# Patient Record
Sex: FEMALE | Race: BLACK OR AFRICAN AMERICAN | Employment: UNEMPLOYED | URBAN - METROPOLITAN AREA
[De-identification: names, ages, dates, MRNs, and addresses within clinical notes are randomized per-mention and may not be internally consistent; named-entity substitution may affect disease eponyms.]

---

## 2017-06-20 PROBLEM — I10 ESSENTIAL HYPERTENSION: Status: ACTIVE | Noted: 2017-06-20

## 2017-06-24 ENCOUNTER — HOSPITAL ENCOUNTER (OUTPATIENT)
Dept: CT IMAGING | Age: 47
Discharge: HOME | End: 2017-06-24
Attending: NURSE PRACTITIONER
Payer: COMMERCIAL

## 2017-06-24 DIAGNOSIS — R59.0 MEDIASTINAL ADENOPATHY: Chronic | ICD-10-CM

## 2017-06-24 DIAGNOSIS — A18.2 TUBERCULOUS LYMPHADENITIS: ICD-10-CM

## 2017-06-24 PROCEDURE — 71250 CT THORAX DX C-: CPT

## 2017-09-23 ENCOUNTER — HOSPITAL ENCOUNTER (OUTPATIENT)
Dept: MAMMOGRAPHY | Age: 47
Discharge: HOME | End: 2017-09-23
Attending: INTERNAL MEDICINE
Payer: COMMERCIAL

## 2017-09-23 DIAGNOSIS — Z12.31 VISIT FOR SCREENING MAMMOGRAM: ICD-10-CM

## 2017-09-23 PROCEDURE — 77067 SCR MAMMO BI INCL CAD: CPT

## 2018-08-14 PROBLEM — E66.01 OBESITY, MORBID (HCC): Status: ACTIVE | Noted: 2018-08-14

## 2018-08-31 ENCOUNTER — HOSPITAL ENCOUNTER (OUTPATIENT)
Dept: SLEEP MEDICINE | Age: 48
Discharge: HOME | End: 2018-08-31
Attending: INTERNAL MEDICINE
Payer: COMMERCIAL

## 2018-08-31 PROCEDURE — 95810 POLYSOM 6/> YRS 4/> PARAM: CPT

## 2018-09-14 ENCOUNTER — HOSPITAL ENCOUNTER (OUTPATIENT)
Dept: SLEEP MEDICINE | Age: 48
Discharge: HOME | End: 2018-09-14
Attending: INTERNAL MEDICINE
Payer: COMMERCIAL

## 2018-09-14 PROCEDURE — 95811 POLYSOM 6/>YRS CPAP 4/> PARM: CPT

## 2018-10-06 ENCOUNTER — HOSPITAL ENCOUNTER (OUTPATIENT)
Dept: MAMMOGRAPHY | Age: 48
Discharge: HOME | End: 2018-10-06
Attending: INTERNAL MEDICINE
Payer: COMMERCIAL

## 2018-10-06 DIAGNOSIS — Z12.39 BREAST CANCER SCREENING: ICD-10-CM

## 2018-10-06 PROCEDURE — 77067 SCR MAMMO BI INCL CAD: CPT

## 2019-05-11 ENCOUNTER — HOSPITAL ENCOUNTER (EMERGENCY)
Age: 49
Discharge: HOME | End: 2019-05-11
Attending: EMERGENCY MEDICINE
Payer: COMMERCIAL

## 2019-05-11 ENCOUNTER — APPOINTMENT (OUTPATIENT)
Dept: GENERAL RADIOLOGY | Age: 49
End: 2019-05-11
Attending: EMERGENCY MEDICINE
Payer: COMMERCIAL

## 2019-05-11 VITALS
HEART RATE: 91 BPM | RESPIRATION RATE: 20 BRPM | HEIGHT: 65 IN | DIASTOLIC BLOOD PRESSURE: 85 MMHG | SYSTOLIC BLOOD PRESSURE: 131 MMHG | OXYGEN SATURATION: 98 % | BODY MASS INDEX: 44.98 KG/M2 | TEMPERATURE: 98.5 F | WEIGHT: 270 LBS

## 2019-05-11 DIAGNOSIS — J06.9 VIRAL URI WITH COUGH: Primary | ICD-10-CM

## 2019-05-11 LAB — DEPRECATED S PYO AG THROAT QL EIA: NEGATIVE

## 2019-05-11 PROCEDURE — 74011000250 HC RX REV CODE- 250: Performed by: EMERGENCY MEDICINE

## 2019-05-11 PROCEDURE — 94760 N-INVAS EAR/PLS OXIMETRY 1: CPT

## 2019-05-11 PROCEDURE — 87880 STREP A ASSAY W/OPTIC: CPT

## 2019-05-11 PROCEDURE — 71046 X-RAY EXAM CHEST 2 VIEWS: CPT

## 2019-05-11 PROCEDURE — 99283 EMERGENCY DEPT VISIT LOW MDM: CPT | Performed by: EMERGENCY MEDICINE

## 2019-05-11 PROCEDURE — 94640 AIRWAY INHALATION TREATMENT: CPT

## 2019-05-11 PROCEDURE — 87081 CULTURE SCREEN ONLY: CPT

## 2019-05-11 RX ORDER — OMEPRAZOLE 20 MG/1
20 CAPSULE, DELAYED RELEASE ORAL DAILY
COMMUNITY
End: 2019-09-05

## 2019-05-11 RX ORDER — ALBUTEROL SULFATE 0.83 MG/ML
2.5 SOLUTION RESPIRATORY (INHALATION)
Status: COMPLETED | OUTPATIENT
Start: 2019-05-11 | End: 2019-05-11

## 2019-05-11 RX ORDER — ALBUTEROL SULFATE 90 UG/1
1 AEROSOL, METERED RESPIRATORY (INHALATION)
Qty: 1 INHALER | Refills: 0 | Status: SHIPPED | OUTPATIENT
Start: 2019-05-11 | End: 2019-11-05 | Stop reason: SDUPTHER

## 2019-05-11 RX ADMIN — ALBUTEROL SULFATE 2.5 MG: 2.5 SOLUTION RESPIRATORY (INHALATION) at 20:44

## 2019-05-12 NOTE — DISCHARGE INSTRUCTIONS
Patient Education        Upper Respiratory Infection (Cold): Care Instructions  Your Care Instructions    An upper respiratory infection, or URI, is an infection of the nose, sinuses, or throat. URIs are spread by coughs, sneezes, and direct contact. The common cold is the most frequent kind of URI. The flu and sinus infections are other kinds of URIs. Almost all URIs are caused by viruses. Antibiotics won't cure them. But you can treat most infections with home care. This may include drinking lots of fluids and taking over-the-counter pain medicine. You will probably feel better in 4 to 10 days. The doctor has checked you carefully, but problems can develop later. If you notice any problems or new symptoms, get medical treatment right away. Follow-up care is a key part of your treatment and safety. Be sure to make and go to all appointments, and call your doctor if you are having problems. It's also a good idea to know your test results and keep a list of the medicines you take. How can you care for yourself at home? · To prevent dehydration, drink plenty of fluids, enough so that your urine is light yellow or clear like water. Choose water and other caffeine-free clear liquids until you feel better. If you have kidney, heart, or liver disease and have to limit fluids, talk with your doctor before you increase the amount of fluids you drink. · Take an over-the-counter pain medicine, such as acetaminophen (Tylenol), ibuprofen (Advil, Motrin), or naproxen (Aleve). Read and follow all instructions on the label. · Before you use cough and cold medicines, check the label. These medicines may not be safe for young children or for people with certain health problems. · Be careful when taking over-the-counter cold or flu medicines and Tylenol at the same time. Many of these medicines have acetaminophen, which is Tylenol. Read the labels to make sure that you are not taking more than the recommended dose.  Too much acetaminophen (Tylenol) can be harmful. · Get plenty of rest.  · Do not smoke or allow others to smoke around you. If you need help quitting, talk to your doctor about stop-smoking programs and medicines. These can increase your chances of quitting for good. When should you call for help? Call 911 anytime you think you may need emergency care. For example, call if:    · You have severe trouble breathing.    Call your doctor now or seek immediate medical care if:    · You seem to be getting much sicker.     · You have new or worse trouble breathing.     · You have a new or higher fever.     · You have a new rash.    Watch closely for changes in your health, and be sure to contact your doctor if:    · You have a new symptom, such as a sore throat, an earache, or sinus pain.     · You cough more deeply or more often, especially if you notice more mucus or a change in the color of your mucus.     · You do not get better as expected. Where can you learn more? Go to http://layla-keely.info/. Enter K131 in the search box to learn more about \"Upper Respiratory Infection (Cold): Care Instructions. \"  Current as of: September 5, 2018  Content Version: 11.9  © 9391-4808 [employer]. Care instructions adapted under license by [employer] (formerly [employer]) (which disclaims liability or warranty for this information). If you have questions about a medical condition or this instruction, always ask your healthcare professional. [place] [address] any warranty or liability for your use of this information.

## 2019-05-12 NOTE — ED PROVIDER NOTES
67-year-old -American female with sore throat for the past 3 days and cough for 2 days. Cough is productive of a yellow greenish sputum. Denies underlying lung disease and does not smoke. No fever. No vomiting or diarrhea. She reports that she could hear some wheezing in her chest.  Patient was treated with albuterol nebulizer prior to my evaluation of her. The history is provided by the patient. Cough Associated symptoms include sore throat and wheezing. Pertinent negatives include no headaches and no vomiting. Sore Throat Associated symptoms include cough. Pertinent negatives include no vomiting, no congestion and no headaches. Past Medical History:  
Diagnosis Date  Acquired hypothyroidism 2016  Anemia 2012  Anxiety PRN meds  Anxiety and depression  Asthma   
 inhalers PRN, no home 02  
 Constipation 2012  Depression   
 non longer on meds  H/O blood clots   
 right breast  
 H/O parathyroidectomy 2012  H/O partial thyroidectomy 2012  Hypertension   
 controlled with med  Insomnia 2016  Mild HTN 2012  Thromboembolus ([address]   
 during pregnancy, right breast  
 Thyroid disease   
 hypo Past Surgical History:  
Procedure Laterality Date  BREAST SURGERY PROCEDURE UNLISTED Right   
 removed \"clot\" from breast- right  CHEST SURGERY PROCEDURE UNLISTED    
 bronchoscpy & mediastinotomy  HX  SECTION    
 HX HYSTERECTOMY partial  
 HX ORTHOPAEDIC Left   
 ankle sx w/ hardware  HX PARATHYROIDECTOMY  HX PARTIAL THYROIDECTOMY Family History:  
Problem Relation Age of Onset  No Known Problems Mother  Cancer Father  Hypertension Other  Thyroid Disease Other Social History Socioeconomic History  Marital status: SINGLE Spouse name: Not on file  Number of children: Not on file  Years of education: Not on file  Highest education level: Not on file Occupational History  Not on file Social Needs  Financial resource strain: Not on file  Food insecurity:  
  Worry: Not on file Inability: Not on file  Transportation needs:  
  Medical: Not on file Non-medical: Not on file Tobacco Use  Smoking status: Never Smoker  Smokeless tobacco: Never Used Substance and Sexual Activity  Alcohol use: Yes Alcohol/week: 0.0 oz  
  Comment: occasional: once a month  Drug use: No  
 Sexual activity: Not on file Lifestyle  Physical activity:  
  Days per week: Not on file Minutes per session: Not on file  Stress: Not on file Relationships  Social connections:  
  Talks on phone: Not on file Gets together: Not on file Attends Bahai service: Not on file Active member of club or organization: Not on file Attends meetings of clubs or organizations: Not on file Relationship status: Not on file  Intimate partner violence:  
  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 Review of Systems Constitutional: Negative for fever. HENT: Positive for sore throat. Negative for congestion. Respiratory: Positive for cough and wheezing. Gastrointestinal: Negative for abdominal pain and vomiting. Genitourinary: Negative for dysuria. Musculoskeletal: Negative for back pain. Skin: Negative for rash. Neurological: Negative for headaches. Vitals:  
 05/11/19 2025 05/11/19 2045 BP: 140/86 Pulse: 98 Resp: 18 Temp: 98.4 °F (36.9 °C) SpO2: 99% 100% Weight: 122.5 kg (270 lb) Height: 5' 5\" (1.651 m) Physical Exam  
 Constitutional: She appears well-developed and well-nourished. No distress. HENT:  
Head: Normocephalic and atraumatic. Mild tonsillar erythema. Uvula midline. No trismus. Eyes: Pupils are equal, round, and reactive to light. Conjunctivae are normal.  
Neck: Normal range of motion. Neck supple. Cardiovascular: Normal rate and regular rhythm. Pulmonary/Chest: Effort normal and breath sounds normal.  
Neurological: She is alert. Skin: Skin is warm. Psychiatric: She has a normal mood and affect. Nursing note and vitals reviewed. MDM Number of Diagnoses or Management Options Diagnosis management comments: Chest x-ray no acute abnormality. Rapid strep negative. No findings on history or physical to suggest bacterial infection. We'll treat her symptomatically with albuterol as this has worked well in her wheezing and cough. Amount and/or Complexity of Data Reviewed Clinical lab tests: ordered and reviewed Tests in the radiology section of CPT®: ordered and reviewed Risk of Complications, Morbidity, and/or Mortality Presenting problems: low Diagnostic procedures: low Management options: low Procedures

## 2019-05-12 NOTE — ED NOTES
I have reviewed discharge instructions with the patient. The patient verbalized understanding. Patient left ED via Discharge Method: ambulatory to Home with self. Opportunity for questions and clarification provided. Patient given 1 scripts. To continue your aftercare when you leave the hospital, you may receive an automated call from our care team to check in on how you are doing. This is a free service and part of our promise to provide the best care and service to meet your aftercare needs.  If you have questions, or wish to unsubscribe from this service please call [phone]. Thank you for Choosing our [facility] Emergency Department.

## 2019-05-14 LAB
BACTERIA SPEC CULT: NORMAL
SERVICE CMNT-IMP: NORMAL

## 2019-09-05 PROBLEM — A04.8 HELICOBACTER PYLORI INFECTION: Status: ACTIVE | Noted: 2019-06-03

## 2019-09-05 PROBLEM — R13.10 DYSPHAGIA: Status: ACTIVE | Noted: 2019-03-27

## 2019-10-04 PROBLEM — G47.33 OSA (OBSTRUCTIVE SLEEP APNEA): Status: ACTIVE | Noted: 2019-10-04

## 2019-11-01 PROBLEM — Z86.010 HISTORY OF COLON POLYPS: Status: ACTIVE | Noted: 2017-10-10

## 2020-01-07 ENCOUNTER — APPOINTMENT (OUTPATIENT)
Dept: GENERAL RADIOLOGY | Age: 50
End: 2020-01-07
Attending: EMERGENCY MEDICINE
Payer: COMMERCIAL

## 2020-01-07 ENCOUNTER — HOSPITAL ENCOUNTER (EMERGENCY)
Age: 50
Discharge: HOME | End: 2020-01-07
Attending: EMERGENCY MEDICINE
Payer: COMMERCIAL

## 2020-01-07 VITALS
BODY MASS INDEX: 43.32 KG/M2 | HEART RATE: 74 BPM | RESPIRATION RATE: 18 BRPM | WEIGHT: 260 LBS | HEIGHT: 65 IN | SYSTOLIC BLOOD PRESSURE: 134 MMHG | DIASTOLIC BLOOD PRESSURE: 87 MMHG | TEMPERATURE: 98.4 F | OXYGEN SATURATION: 98 %

## 2020-01-07 DIAGNOSIS — R07.9 CHEST PAIN OF UNCERTAIN ETIOLOGY: Primary | ICD-10-CM

## 2020-01-07 LAB
ALBUMIN SERPL-MCNC: 3.5 G/DL (ref 3.5–5)
ALBUMIN/GLOB SERPL: 0.8 {RATIO} (ref 1.2–3.5)
ALP SERPL-CCNC: 107 U/L (ref 50–130)
ALT SERPL-CCNC: 17 U/L (ref 12–65)
ANION GAP SERPL CALC-SCNC: 3 MMOL/L (ref 7–16)
AST SERPL-CCNC: 12 U/L (ref 15–37)
ATRIAL RATE: 87 BPM
BASOPHILS # BLD: 0 K/UL (ref 0–0.2)
BASOPHILS NFR BLD: 0 % (ref 0–2)
BILIRUB SERPL-MCNC: 0.5 MG/DL (ref 0.2–1.1)
BUN SERPL-MCNC: 12 MG/DL (ref 6–23)
CALCIUM SERPL-MCNC: 10.5 MG/DL (ref 8.3–10.4)
CALCULATED P AXIS, ECG09: 60 DEGREES
CALCULATED R AXIS, ECG10: 60 DEGREES
CALCULATED T AXIS, ECG11: 43 DEGREES
CHLORIDE SERPL-SCNC: 103 MMOL/L (ref 98–107)
CO2 SERPL-SCNC: 34 MMOL/L (ref 21–32)
CREAT SERPL-MCNC: 0.8 MG/DL (ref 0.6–1)
DIAGNOSIS, 93000: NORMAL
DIFFERENTIAL METHOD BLD: ABNORMAL
EOSINOPHIL # BLD: 0.2 K/UL (ref 0–0.8)
EOSINOPHIL NFR BLD: 3 % (ref 0.5–7.8)
ERYTHROCYTE [DISTWIDTH] IN BLOOD BY AUTOMATED COUNT: 14.8 % (ref 11.9–14.6)
GLOBULIN SER CALC-MCNC: 4.4 G/DL (ref 2.3–3.5)
GLUCOSE SERPL-MCNC: 105 MG/DL (ref 65–100)
HCT VFR BLD AUTO: 41.2 % (ref 35.8–46.3)
HGB BLD-MCNC: 13 G/DL (ref 11.7–15.4)
IMM GRANULOCYTES # BLD AUTO: 0 K/UL (ref 0–0.5)
IMM GRANULOCYTES NFR BLD AUTO: 0 % (ref 0–5)
LYMPHOCYTES # BLD: 2.1 K/UL (ref 0.5–4.6)
LYMPHOCYTES NFR BLD: 30 % (ref 13–44)
MCH RBC QN AUTO: 27.5 PG (ref 26.1–32.9)
MCHC RBC AUTO-ENTMCNC: 31.6 G/DL (ref 31.4–35)
MCV RBC AUTO: 87.3 FL (ref 79.6–97.8)
MONOCYTES # BLD: 0.6 K/UL (ref 0.1–1.3)
MONOCYTES NFR BLD: 8 % (ref 4–12)
NEUTS SEG # BLD: 4.1 K/UL (ref 1.7–8.2)
NEUTS SEG NFR BLD: 58 % (ref 43–78)
NRBC # BLD: 0 K/UL (ref 0–0.2)
P-R INTERVAL, ECG05: 170 MS
PLATELET # BLD AUTO: 376 K/UL (ref 150–450)
PMV BLD AUTO: 9.2 FL (ref 9.4–12.3)
POTASSIUM SERPL-SCNC: 3.3 MMOL/L (ref 3.5–5.1)
PROT SERPL-MCNC: 7.9 G/DL (ref 6.3–8.2)
Q-T INTERVAL, ECG07: 370 MS
QRS DURATION, ECG06: 92 MS
QTC CALCULATION (BEZET), ECG08: 445 MS
RBC # BLD AUTO: 4.72 M/UL (ref 4.05–5.2)
SODIUM SERPL-SCNC: 140 MMOL/L (ref 136–145)
TROPONIN I SERPL-MCNC: <0.02 NG/ML (ref 0.02–0.05)
TROPONIN I SERPL-MCNC: <0.02 NG/ML (ref 0.02–0.05)
VENTRICULAR RATE, ECG03: 87 BPM
WBC # BLD AUTO: 7 K/UL (ref 4.3–11.1)

## 2020-01-07 PROCEDURE — 96374 THER/PROPH/DIAG INJ IV PUSH: CPT | Performed by: EMERGENCY MEDICINE

## 2020-01-07 PROCEDURE — 74011250637 HC RX REV CODE- 250/637: Performed by: EMERGENCY MEDICINE

## 2020-01-07 PROCEDURE — 93005 ELECTROCARDIOGRAM TRACING: CPT | Performed by: EMERGENCY MEDICINE

## 2020-01-07 PROCEDURE — 84484 ASSAY OF TROPONIN QUANT: CPT

## 2020-01-07 PROCEDURE — 85025 COMPLETE CBC W/AUTO DIFF WBC: CPT

## 2020-01-07 PROCEDURE — 71046 X-RAY EXAM CHEST 2 VIEWS: CPT

## 2020-01-07 PROCEDURE — 74011250636 HC RX REV CODE- 250/636: Performed by: EMERGENCY MEDICINE

## 2020-01-07 PROCEDURE — 99285 EMERGENCY DEPT VISIT HI MDM: CPT | Performed by: EMERGENCY MEDICINE

## 2020-01-07 PROCEDURE — 80053 COMPREHEN METABOLIC PANEL: CPT

## 2020-01-07 RX ORDER — POTASSIUM CHLORIDE 20 MEQ/1
40 TABLET, EXTENDED RELEASE ORAL
Status: COMPLETED | OUTPATIENT
Start: 2020-01-07 | End: 2020-01-07

## 2020-01-07 RX ORDER — KETOROLAC TROMETHAMINE 30 MG/ML
15 INJECTION, SOLUTION INTRAMUSCULAR; INTRAVENOUS
Status: COMPLETED | OUTPATIENT
Start: 2020-01-07 | End: 2020-01-07

## 2020-01-07 RX ADMIN — KETOROLAC TROMETHAMINE 15 MG: 30 INJECTION, SOLUTION INTRAMUSCULAR at 18:56

## 2020-01-07 RX ADMIN — POTASSIUM CHLORIDE 40 MEQ: 1500 TABLET, EXTENDED RELEASE ORAL at 17:46

## 2020-01-07 NOTE — ED TRIAGE NOTES
Patient advises that she will have sudden on set of chest pain with shortness of breath and nausea. Patient advises that she is also having some pain to left shoulder. Patient advises pain comes and goes only lasting a few minutes. Patient advises that she has taken her BP medication already today.

## 2020-01-07 NOTE — DISCHARGE INSTRUCTIONS
As we discussed, the exact cause of your chest pain is not definitely known. However it is possible that it could be due to inflammation in your chest wall. Take ibuprofen 600mg three times a day with food.  Follow up with cardiology to into the cause of your chest pain determine if you need further testing such as a stress test.

## 2020-01-07 NOTE — LETTER
[id number] [address] EMERGENCY DEPT 
[address] [facility] [address] [phone] Work/School Note Date: 1/7/2020 To Whom It May concern: 
 
Maximilian Cross was seen and treated today in the emergency room by the following provider(s): 
Attending Provider: Bina Amaya MD. Maximilian Cross may return to work on Thursday January 9th 2020. Sincerely, Nicky Perrin RN

## 2020-01-07 NOTE — ED PROVIDER NOTES
[address] Judy Durán is a 51 y.o. female seen on 1/7/2020 at 5:29 PM in the [facility] EMERGENCY DEPT in room HB/B. Chief Complaint   Patient presents with    Chest Pain     HPI: 81-year-old female presenting to the emergency department for evaluation of chest pain. She states that she has had chest pain off and on for the last 2 days. She describes it as sharp, located sternally. It is associated with movement of her shoulders and chest wall but not associated with exertion or walking. No associated shortness of breath or diaphoresis. There is no radiation of pain. Pain is also reproducible to palpation. She has no history of similar symptoms in the past.  She states she thinks she may have seen a cardiologist once and then in the past for chest pain but cannot remember. Historian: Patient    REVIEW OF SYSTEMS     Review of Systems   Constitutional: Negative for fever. HENT: Negative. Eyes: Negative. Respiratory: Negative for cough, chest tightness, shortness of breath and wheezing. Cardiovascular: Positive for chest pain. Gastrointestinal: Negative for abdominal distention, abdominal pain, constipation, diarrhea and vomiting. Endocrine: Negative. Genitourinary: Negative for dysuria, flank pain, frequency and urgency. Neurological: Negative for dizziness, syncope and headaches. Psychiatric/Behavioral: Negative. All other systems reviewed and are negative.       PAST MEDICAL HISTORY     Past Medical History:   Diagnosis Date    Acquired hypothyroidism 1/25/2016    Anemia 7/12/2012    Anxiety     PRN meds    Anxiety and depression     Asthma     inhalers PRN, no home 02    Constipation 7/12/2012    Depression     non longer on meds    Diabetes ([address]     H/O blood clots     right breast    H/O parathyroidectomy 7/12/2012    H/O partial thyroidectomy 7/12/2012    Hypertension     controlled with med    Infectious disease TB    Insomnia 2016    Mild HTN 2012    Thromboembolus ([address]     during pregnancy, right breast    Thyroid disease     hypo     Past Surgical History:   Procedure Laterality Date    BREAST SURGERY PROCEDURE UNLISTED Right     removed \"clot\" from breast- right    CHEST SURGERY PROCEDURE UNLISTED      bronchoscpy & mediastinotomy    HX  SECTION      HX HYSTERECTOMY      partial    HX ORTHOPAEDIC Left     ankle sx w/ hardware    HX PARATHYROIDECTOMY      HX PARTIAL THYROIDECTOMY       Social History     Socioeconomic History    Marital status: SINGLE     Spouse name: Not on file    Number of children: Not on file    Years of education: Not on file    Highest education level: Not on file   Tobacco Use    Smoking status: Never Smoker    Smokeless tobacco: Never Used   Substance and Sexual Activity    Alcohol use: Yes     Alcohol/week: 0.0 standard drinks     Comment: occasional: once a month    Drug use: No   Social History Narrative    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, 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 Taking? ALPRAZolam (XANAX) 0.5 mg tablet   No No   Sig: Take 1 Tab by mouth two (2) times daily as needed for Anxiety. Max Daily Amount: 1 mg. Blood-Glucose Meter monitoring kit   No No   Sig: Accu-chek Guide Monitor System or any meter insurance will cover check BS BID DX:E11.9   acetaminophen (TYLENOL) 500 mg tablet   No No   Sig: Take 1 Tab by mouth every six (6) hours as needed for Pain. albuterol (PROVENTIL HFA, VENTOLIN HFA, PROAIR HFA) 90 mcg/actuation inhaler   No No   Sig: Take 1 Puff by inhalation every four (4) hours as needed for Wheezing. cetirizine (ZYRTEC) 10 mg tablet   No No   Sig: Take 1 Tab by mouth daily. cyclobenzaprine (FLEXERIL) 10 mg tablet   No No   Sig: Take 1 Tab by mouth three (3) times daily as needed for Muscle Spasm(s).    doxycycline (MONODOX) 100 mg capsule   No No   Sig: Take 1 Cap by mouth two (2) times a day for 7 days. ertugliflozin (STEGLATRO) 5 mg tab   No No   Sig: Take 5 mg by mouth daily. fluticasone propionate (FLONASE) 50 mcg/actuation nasal spray   No No   Si Sprays by Both Nostrils route daily. glucose blood VI test strips (ASCENSIA AUTODISC VI, ONE TOUCH ULTRA TEST VI) strip   No No   Sig: Accu-chek Erika Plus test strips or any test strips insurance will cover. Test BID prn. Dx:E11.9   hydroCHLOROthiazide (HYDRODIURIL) 25 mg tablet   No No   Sig: Take 1 Tab by mouth daily. ibuprofen (MOTRIN) 800 mg tablet   No No   Sig: Take 1 Tab by mouth every eight (8) hours as needed for Pain.   lancets misc   No No   Sig: Accu-chek Guide fastclix lancet drums; Check BS BID; Dx:E11.9   levothyroxine (SYNTHROID) 88 mcg tablet   No No   Sig: Take 1 Tab by mouth Daily (before breakfast). lisinopril (PRINIVIL, ZESTRIL) 20 mg tablet   No No   Sig: Take 1 Tab by mouth daily. metFORMIN (GLUCOPHAGE) 500 mg tablet   No No   Sig: Take 2 Tabs by mouth two (2) times a day. Facility-Administered Medications: None     Allergies   Allergen Reactions    Other Medication Other (comments)     Unknown med given at cardiology -Possible dye ( BODY WENT NUMB ALL OVER)        PHYSICAL EXAM       Vitals:    20 1550   BP: (!) 170/101   Pulse: 85   Resp: 16   Temp: 98.5 °F (36.9 °C)   SpO2: 96%    Vital signs were reviewed. Physical Exam  Vitals signs reviewed. Constitutional:       General: She is not in acute distress. Appearance: She is well-developed. She is not diaphoretic. HENT:      Head: Normocephalic and atraumatic. Eyes:      Pupils: Pupils are equal, round, and reactive to light. Neck:      Musculoskeletal: Normal range of motion and neck supple. Cardiovascular:      Rate and Rhythm: Normal rate and regular rhythm. Heart sounds: Normal heart sounds. No murmur. No friction rub. No gallop.     Pulmonary:      Effort: Pulmonary effort is normal. No respiratory distress. Breath sounds: Normal breath sounds. No stridor. No wheezing. Chest:      Chest wall: Tenderness (reproduces chest pain) present. Abdominal:      General: Bowel sounds are normal. There is no distension. Palpations: Abdomen is soft. There is no mass. Tenderness: There is no tenderness. There is no guarding or rebound. Musculoskeletal: Normal range of motion. General: No tenderness or deformity. Skin:     General: Skin is warm and dry. Findings: No erythema or rash. Neurological:      Mental Status: She is alert and oriented to person, place, and time. Sensory: No sensory deficit. Comments: No focal neuro deficits   Psychiatric:         Behavior: Behavior normal.          MEDICAL DECISION MAKING     MDM  Number of Diagnoses or Management Options     Amount and/or Complexity of Data Reviewed  Clinical lab tests: ordered and reviewed  Tests in the radiology section of CPT®: ordered and reviewed  Review and summarize past medical records: yes    Risk of Complications, Morbidity, and/or Mortality  Presenting problems: high  Diagnostic procedures: high      Procedures    ED Course:    Patient presents to the ED today complaining of chest pain. Has a nonischemic EKG, negative troponin, unremarkable CXR. Given these findings, I think this is very unlikely to represent ACS or other concerning cause of chest pain. Repeat troponin is negative as well. Her description of symptoms sound consistent with possible costochondritis or other inflammatory process. I recommended a short course of anti-inflammatories. I will place referral to cardiology for close follow-up to determine if she needs further re-stratification. She is comfortable this plan understands reasons to return to the emergency department.     Disposition: Discharge home  Diagnosis: Chest pain uncertain etiology  ____________________________________________________________________  A portion of this note was generated using voice recognition dictation software. While the note has been reviewed for accuracy, please note certain words and phrases may not be transcribed as intended and some grammatical and/or typographical errors may be present.

## 2020-01-08 NOTE — ED NOTES
I have reviewed discharge instructions with the patient. The patient verbalized understanding. Patient to follow up with cardiology, PMD as referred and RTED with any changes/concerns. Patient expresses understanding. Patient ambulatory from ED, no new Rx.

## 2020-02-14 ENCOUNTER — HOSPITAL ENCOUNTER (OUTPATIENT)
Dept: MAMMOGRAPHY | Age: 50
Discharge: HOME | End: 2020-02-14
Attending: INTERNAL MEDICINE
Payer: COMMERCIAL

## 2020-02-14 DIAGNOSIS — Z12.31 SCREENING MAMMOGRAM, ENCOUNTER FOR: ICD-10-CM

## 2020-02-14 PROCEDURE — 77067 SCR MAMMO BI INCL CAD: CPT

## 2020-06-26 ENCOUNTER — APPOINTMENT (OUTPATIENT)
Dept: GENERAL RADIOLOGY | Age: 50
End: 2020-06-26
Attending: EMERGENCY MEDICINE
Payer: COMMERCIAL

## 2020-06-26 ENCOUNTER — HOSPITAL ENCOUNTER (EMERGENCY)
Age: 50
Discharge: HOME | End: 2020-06-26
Attending: EMERGENCY MEDICINE
Payer: COMMERCIAL

## 2020-06-26 VITALS
HEART RATE: 89 BPM | TEMPERATURE: 98.8 F | WEIGHT: 262 LBS | OXYGEN SATURATION: 98 % | SYSTOLIC BLOOD PRESSURE: 144 MMHG | HEIGHT: 66 IN | DIASTOLIC BLOOD PRESSURE: 88 MMHG | BODY MASS INDEX: 42.11 KG/M2 | RESPIRATION RATE: 20 BRPM

## 2020-06-26 DIAGNOSIS — J06.9 ACUTE UPPER RESPIRATORY INFECTION: Primary | ICD-10-CM

## 2020-06-26 DIAGNOSIS — Z20.822 SUSPECTED COVID-19 VIRUS INFECTION: ICD-10-CM

## 2020-06-26 PROCEDURE — 71045 X-RAY EXAM CHEST 1 VIEW: CPT

## 2020-06-26 PROCEDURE — 99283 EMERGENCY DEPT VISIT LOW MDM: CPT

## 2020-06-27 ENCOUNTER — PATIENT OUTREACH (OUTPATIENT)
Dept: CASE MANAGEMENT | Age: 50
End: 2020-06-27

## 2020-06-27 NOTE — ED NOTES
Pt presents to the ED for shortness of breath and cough for several days. States that she was tested for Covid on 6/22 but has not received the results.  States that she has been taking steriods

## 2020-06-27 NOTE — ED NOTES
I have reviewed discharge instructions with the patient. The patient verbalized understanding. Patient left ED via Discharge Method: ambulatory to Home with self. Opportunity for questions and clarification provided. Patient given 0 scripts. To continue your aftercare when you leave the hospital, you may receive an automated call from our care team to check in on how you are doing. This is a free service and part of our promise to provide the best care and service to meet your aftercare needs.  If you have questions, or wish to unsubscribe from this service please call [phone]. Thank you for Choosing our [facility] AT THE [facility] Emergency Department.

## 2020-06-27 NOTE — DISCHARGE INSTRUCTIONS
As we discussed, there is a high chance that you may have COVID-19. Therefore, it is important for you to remain isolated at home until you get your test results back. Please follow-up with your primary care doctor for further evaluation. Please return to the emergency department with any difficulty breathing, vomiting, or additional concerns.

## 2020-06-27 NOTE — PROGRESS NOTES
Patient contacted regarding recent discharge and COVID-19 risk. Discussed COVID-19 related testing which was pending at this time. Test results were pending. Patient informed of results, if available? Results not available -pending at this time   Care Transition Nurse/ Ambulatory Care Manager contacted the patient by telephone to perform post discharge assessment. Verified name and  with patient as identifiers. Patient has following risk factors of: asthma, Hx. of TB, HTN obesity. CTN/ACM reviewed discharge instructions, medical action plan and red flags related to discharge diagnosis. Reviewed and educated them on any new and changed medications related to discharge diagnosis. Advised obtaining a 90-day supply of all daily and as-needed medications. Education provided regarding infection prevention, and signs and symptoms of COVID-19 and when to seek medical attention with patient who verbalized understanding. Discussed exposure protocols and quarantine from [address] you at higher risk for severe illness  and given an opportunity for questions and concerns. The patient agrees to contact the COVID-19 hotline [phone] or PCP office for questions related to their healthcare. CTN/ACM provided contact information for future reference. From CDC: Are you at higher risk for severe illness?  Wash your hands often.  Avoid close contact (6 feet, which is about two arm lengths) with people who are sick.  Put distance between yourself and other people if COVID-19 is spreading in your community.  Clean and disinfect frequently touched surfaces.  Avoid all cruise travel and non-essential air travel.  Call your healthcare professional if you have concerns about COVID-19 and your underlying condition or if you are sick.     For more information on steps you can take to protect yourself, see CDC's How to Protect Yourself      Patient/family/caregiver given information for Nadine Lockhart and agrees to enroll no  Patient's preferred e-mail:  n/a  Patient's preferred phone number: n/a. Based on Loop alert triggers, patient will be contacted by nurse care manager for worsening symptoms. Plan for follow-up call in 1-2 days based on severity of symptoms and risk factors.

## 2020-06-27 NOTE — ED TRIAGE NOTES
PT. Had decreased sense of smell 9 days ago with cough and asthma symptoms. She was placed on steroids 7 days ago and a Covid -19 test was done on 6/22/20, but the results have not been received. Pt. States she is still SOB and her cough is no better.

## 2020-06-27 NOTE — ED PROVIDER NOTES
81-year-old lady presents with concerns about persistent shortness of breath and cough that has been present for a couple of weeks. She has tried a round of steroids and breathing treatments from her primary care doctor without any relief. She said that she did get a COVID-19 test performed at the drive-through testing site at the [address] [place] [address] Str. on  but she does not have those results yet. She said that she did lose her sense of smell earlier in the week and has had some body aches. No other associated symptoms. Elements of this note were created using speech recognition software. As such, errors of speech recognition may be present.              Past Medical History:   Diagnosis Date    Acquired hypothyroidism 2016    Anemia 2012    Anxiety     PRN meds    Anxiety and depression     Asthma     inhalers PRN, no home 02    Constipation 2012    Depression     non longer on meds    Diabetes ([address]     H/O blood clots     right breast    H/O parathyroidectomy 2012    H/O partial thyroidectomy 2012    Hypertension     controlled with med    Infectious disease     TB    Insomnia 2016    Mild HTN 2012    Thromboembolus ([address]     during pregnancy, right breast    Thyroid disease     hypo       Past Surgical History:   Procedure Laterality Date    BREAST SURGERY PROCEDURE UNLISTED Right     removed \"clot\" from breast- right    CHEST SURGERY PROCEDURE UNLISTED      bronchoscpy & mediastinotomy    HX  SECTION      HX HYSTERECTOMY      partial    HX ORTHOPAEDIC Left     ankle sx w/ hardware    HX PARATHYROIDECTOMY      HX PARTIAL THYROIDECTOMY           Family History:   Problem Relation Age of Onset    No Known Problems Mother     Cancer Father     Hypertension Other     Thyroid Disease Other     Breast Cancer Neg Hx        Social History     Socioeconomic History    Marital status: SINGLE     Spouse name: Not on file    Number of children: Not on file    Years of education: Not on file    Highest education level: Not on file   Occupational History    Not on file   Social Needs    Financial resource strain: Not on file    Food insecurity     Worry: Not on file     Inability: Not on file    Transportation needs     Medical: Not on file     Non-medical: Not on file   Tobacco Use    Smoking status: Never Smoker    Smokeless tobacco: Never Used   Substance and Sexual Activity    Alcohol use: Yes     Alcohol/week: 0.0 standard drinks     Comment: occasional: once a month    Drug use: No    Sexual activity: Not on file   Lifestyle    Physical activity     Days per week: Not on file     Minutes per session: Not on file    Stress: Not on file   Relationships    Social connections     Talks on phone: Not on file     Gets together: Not on file     Attends Jainism service: Not on file     Active member of club or organization: Not on file     Attends meetings of clubs or organizations: Not on file     Relationship status: Not on file    Intimate partner violence     Fear of current or ex partner: Not on file     Emotionally abused: Not on file     Physically abused: Not on file     Forced sexual activity: Not on file   Other Topics Concern    Not on file   Social History Narrative    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, 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 Negative for diaphoresis. HENT: Negative for congestion, rhinorrhea and sore throat. Loss of sense of smell   Eyes: Negative for redness and visual disturbance. Respiratory: Positive for cough and shortness of breath. Negative for chest tightness and wheezing. Cardiovascular: Negative for chest pain and palpitations. Gastrointestinal: Negative for abdominal pain, blood in stool, diarrhea, nausea and vomiting. Endocrine: Negative for polydipsia and polyuria.    Genitourinary: Negative for dysuria and hematuria. Musculoskeletal: Positive for myalgias. Negative for arthralgias and neck stiffness. Skin: Negative for rash. Allergic/Immunologic: Negative for environmental allergies and food allergies. Neurological: Negative for dizziness, weakness and headaches. Hematological: Negative for adenopathy. Does not bruise/bleed easily. Psychiatric/Behavioral: Negative for confusion and sleep disturbance. The patient is not nervous/anxious. Vitals:    06/26/20 2034   BP: 144/90   Pulse: 100   Resp: 20   Temp: 98.8 °F (37.1 °C)   SpO2: 96%   Weight: 118.8 kg (262 lb)   Height: 5' 6\" (1.676 m)            Physical Exam  Vitals signs and nursing note reviewed. Constitutional:       General: She is not in acute distress. Appearance: She is well-developed. She is not toxic-appearing. HENT:      Head: Normocephalic and atraumatic. Eyes:      General: No scleral icterus. Right eye: No discharge. Left eye: No discharge. Conjunctiva/sclera: Conjunctivae normal.      Pupils: Pupils are equal, round, and reactive to light. Neck:      Musculoskeletal: Normal range of motion. No neck rigidity. Cardiovascular:      Rate and Rhythm: Normal rate and regular rhythm. Heart sounds: Normal heart sounds. Pulmonary:      Effort: Pulmonary effort is normal. No respiratory distress. Breath sounds: Normal breath sounds. No wheezing or rales. Chest:      Chest wall: No tenderness. Abdominal:      General: Bowel sounds are normal. There is no distension. Palpations: Abdomen is soft. Tenderness: There is no guarding or rebound. Musculoskeletal: Normal range of motion. General: No tenderness. Lymphadenopathy:      Cervical: No cervical adenopathy. Skin:     General: Skin is warm and dry. Neurological:      General: No focal deficit present. Mental Status: She is alert and oriented to person, place, and time.    Psychiatric: Mood and Affect: Mood normal.         Behavior: Behavior normal.          MDM  Number of Diagnoses or Management Options  Diagnosis management comments: Patient's vital signs are normal.  Her O2 sat has been in the mid to upper 90s. Her chest x-ray is unremarkable. I do not think she needs admission to the hospital and I will discharge her home.          Procedures

## 2020-07-01 ENCOUNTER — PATIENT OUTREACH (OUTPATIENT)
Dept: CASE MANAGEMENT | Age: 50
End: 2020-07-01

## 2020-07-01 NOTE — PROGRESS NOTES
Patient contacted regarding COVID-19 risk and screening. Discussed COVID-19 related testing which was available at this time. Test results were positive. Patient informed of results, if available? Yes, on 20 by the sleep center (ordering facility). Care Transition Nurse/ Ambulatory Care Manager contacted the patient by telephone to perform follow-up assessment. Verified name and  with patient as identifiers. Patient has following risk factors of: asthma, Hx. of TB, HTN obesity. Symptoms reviewed with patient who verbalized the following symptoms: fatigue, pain or aching joints, cough and shortness of breath. Due to no new or worsening symptoms encounter was not routed to provider for escalation. Education provided regarding infection prevention, and signs and symptoms of COVID-19 and when to seek medical attention with patient who verbalized understanding. Discussed exposure protocols and quarantine from [address] you at higher risk for severe illness  and given an opportunity for questions and concerns. The patient agrees to contact the COVID-19 hotline [phone] or PCP office for questions related to their healthcare. CTN/ACM provided contact information for future reference. From CDC: Are you at higher risk for severe illness?  Wash your hands often.  Avoid close contact (6 feet, which is about two arm lengths) with people who are sick.  Put distance between yourself and other people if COVID-19 is spreading in your community.  Clean and disinfect frequently touched surfaces.  Avoid all cruise travel and non-essential air travel.  Call your healthcare professional if you have concerns about COVID-19 and your underlying condition or if you are sick. For more information on steps you can take to protect yourself, see CDC's How to [place] for follow-up call in 7-14 days based on severity of symptoms and risk factors.

## 2020-07-14 ENCOUNTER — PATIENT OUTREACH (OUTPATIENT)
Dept: CASE MANAGEMENT | Age: 50
End: 2020-07-14

## 2020-08-12 ENCOUNTER — HOSPITAL ENCOUNTER (OUTPATIENT)
Dept: MAMMOGRAPHY | Age: 50
Discharge: HOME | End: 2020-08-12
Attending: INTERNAL MEDICINE
Payer: COMMERCIAL

## 2020-08-12 DIAGNOSIS — E21.0 PRIMARY HYPERPARATHYROIDISM (HCC): ICD-10-CM

## 2020-08-12 PROCEDURE — 77080 DXA BONE DENSITY AXIAL: CPT

## 2021-01-20 ENCOUNTER — HOSPITAL ENCOUNTER (OUTPATIENT)
Dept: LAB | Age: 51
Discharge: HOME | End: 2021-01-20
Payer: COMMERCIAL

## 2021-01-20 DIAGNOSIS — E78.2 MIXED HYPERLIPIDEMIA: ICD-10-CM

## 2021-01-20 LAB
CHOLEST SERPL-MCNC: 231 MG/DL
HDLC SERPL-MCNC: 53 MG/DL (ref 40–60)
HDLC SERPL: 4.4 {RATIO}
LDLC SERPL CALC-MCNC: 155.6 MG/DL
LIPID PROFILE,FLP: ABNORMAL
TRIGL SERPL-MCNC: 112 MG/DL (ref 35–150)
VLDLC SERPL CALC-MCNC: 22.4 MG/DL (ref 6–23)

## 2021-01-20 PROCEDURE — 36415 COLL VENOUS BLD VENIPUNCTURE: CPT

## 2021-01-20 PROCEDURE — 80061 LIPID PANEL: CPT

## 2021-01-27 ENCOUNTER — TRANSCRIBE ORDER (OUTPATIENT)
Dept: SCHEDULING | Age: 51
End: 2021-01-27

## 2021-01-27 DIAGNOSIS — Z12.31 ENCOUNTER FOR SCREENING MAMMOGRAM FOR MALIGNANT NEOPLASM OF BREAST: Primary | ICD-10-CM

## 2021-02-20 ENCOUNTER — HOSPITAL ENCOUNTER (OUTPATIENT)
Dept: MAMMOGRAPHY | Age: 51
Discharge: HOME | End: 2021-02-20
Attending: INTERNAL MEDICINE
Payer: COMMERCIAL

## 2021-02-20 DIAGNOSIS — Z12.31 ENCOUNTER FOR SCREENING MAMMOGRAM FOR MALIGNANT NEOPLASM OF BREAST: ICD-10-CM

## 2021-02-20 PROCEDURE — 77067 SCR MAMMO BI INCL CAD: CPT

## 2021-02-26 ENCOUNTER — HOSPITAL ENCOUNTER (OUTPATIENT)
Dept: GENERAL RADIOLOGY | Age: 51
Discharge: HOME | End: 2021-02-26
Attending: NURSE PRACTITIONER
Payer: COMMERCIAL

## 2021-02-26 DIAGNOSIS — K21.9 GASTROESOPHAGEAL REFLUX DISEASE, UNSPECIFIED WHETHER ESOPHAGITIS PRESENT: ICD-10-CM

## 2021-02-26 PROCEDURE — 74240 X-RAY XM UPR GI TRC 1CNTRST: CPT

## 2021-02-26 PROCEDURE — 74011000250 HC RX REV CODE- 250: Performed by: NURSE PRACTITIONER

## 2021-02-26 PROCEDURE — 74011000255 HC RX REV CODE- 255: Performed by: NURSE PRACTITIONER

## 2021-02-26 RX ADMIN — BARIUM SULFATE 700 MG: 700 TABLET ORAL at 14:01

## 2021-02-26 RX ADMIN — BARIUM SULFATE 355 ML: 0.6 SUSPENSION ORAL at 14:01

## 2021-02-26 RX ADMIN — BARIUM SULFATE 135 ML: 980 POWDER, FOR SUSPENSION ORAL at 14:01

## 2021-02-26 RX ADMIN — ANTACID/ANTIFLATULENT 4 G: 380; 550; 10; 10 GRANULE, EFFERVESCENT ORAL at 14:01

## 2021-03-01 ENCOUNTER — APPOINTMENT (OUTPATIENT)
Dept: PHYSICAL THERAPY | Age: 51
End: 2021-03-01
Attending: NURSE PRACTITIONER

## 2021-03-26 ENCOUNTER — HOSPITAL ENCOUNTER (OUTPATIENT)
Dept: LAB | Age: 51
Discharge: HOME | End: 2021-03-26
Attending: NURSE PRACTITIONER
Payer: COMMERCIAL

## 2021-03-26 DIAGNOSIS — Z01.812 ENCOUNTER FOR PRE-OPERATIVE LABORATORY TESTING: ICD-10-CM

## 2021-03-26 DIAGNOSIS — Z86.19 HISTORY OF HELICOBACTER PYLORI INFECTION: ICD-10-CM

## 2021-03-26 LAB
25(OH)D3 SERPL-MCNC: 93.3 NG/ML (ref 30–100)
EST. AVERAGE GLUCOSE BLD GHB EST-MCNC: 148 MG/DL
HBA1C MFR BLD: 6.8 % (ref 4.2–6.3)
TSH SERPL DL<=0.005 MIU/L-ACNC: 0.92 UIU/ML

## 2021-03-26 PROCEDURE — 83036 HEMOGLOBIN GLYCOSYLATED A1C: CPT

## 2021-03-26 PROCEDURE — 82306 VITAMIN D 25 HYDROXY: CPT

## 2021-03-26 PROCEDURE — 80323 ALKALOIDS NOS: CPT

## 2021-03-26 PROCEDURE — 84443 ASSAY THYROID STIM HORMONE: CPT

## 2021-04-01 ENCOUNTER — HOSPITAL ENCOUNTER (OUTPATIENT)
Dept: LAB | Age: 51
Discharge: HOME | End: 2021-04-01
Payer: COMMERCIAL

## 2021-04-01 PROCEDURE — 87338 HPYLORI STOOL AG IA: CPT

## 2021-04-03 LAB
COTININE SERPL-MCNC: <1 NG/ML
H PYLORI AG STL QL IA: NEGATIVE
NICOTINE SERPL-MCNC: <1 NG/ML
SPECIMEN SOURCE: NORMAL

## 2021-04-14 NOTE — PROGRESS NOTES
Per [facility] scheduler, pt requested a later date (orginally scheduled for 4/26), pt now scheduled for 5/10 @ 1400.

## 2021-04-18 ENCOUNTER — APPOINTMENT (OUTPATIENT)
Dept: GENERAL RADIOLOGY | Age: 51
End: 2021-04-18
Attending: EMERGENCY MEDICINE
Payer: COMMERCIAL

## 2021-04-18 ENCOUNTER — HOSPITAL ENCOUNTER (EMERGENCY)
Age: 51
Discharge: HOME | End: 2021-04-18
Attending: EMERGENCY MEDICINE
Payer: COMMERCIAL

## 2021-04-18 VITALS
HEIGHT: 65 IN | WEIGHT: 260 LBS | HEART RATE: 84 BPM | RESPIRATION RATE: 18 BRPM | SYSTOLIC BLOOD PRESSURE: 147 MMHG | TEMPERATURE: 98.6 F | DIASTOLIC BLOOD PRESSURE: 98 MMHG | BODY MASS INDEX: 43.32 KG/M2 | OXYGEN SATURATION: 97 %

## 2021-04-18 DIAGNOSIS — S50.11XA CONTUSION OF RIGHT FOREARM, INITIAL ENCOUNTER: ICD-10-CM

## 2021-04-18 DIAGNOSIS — S51.811A FOREARM LACERATION, RIGHT, INITIAL ENCOUNTER: Primary | ICD-10-CM

## 2021-04-18 PROCEDURE — 99283 EMERGENCY DEPT VISIT LOW MDM: CPT

## 2021-04-18 PROCEDURE — 74011000250 HC RX REV CODE- 250: Performed by: EMERGENCY MEDICINE

## 2021-04-18 PROCEDURE — 75810000293 HC SIMP/SUPERF WND  RPR

## 2021-04-18 PROCEDURE — 73090 X-RAY EXAM OF FOREARM: CPT

## 2021-04-18 RX ADMIN — Medication 3 ML: at 22:18

## 2021-04-18 NOTE — LETTER
[address] [address] EMERGENCY DEPT 
[address] [facility] 
[facility] [id number] [phone] Work/School Note Date: 4/18/2021 To Whom It May concern: 
 
Alonso Irby was seen and treated today in the emergency room by the following provider(s): 
Attending Provider: Kasey San DO. Alonso Irby is excused from work/school on 4/18/2021 through 4/21/2021. She is medically clear to return to work/school on 4/22/2021. Sincerely, Mendoza Oviedo RN

## 2021-04-19 NOTE — ED NOTES
I have reviewed discharge instructions with the patient. The patient verbalized understanding. Patient left ED via Discharge Method: ambulatory to Home with self. Opportunity for questions and clarification provided. Patient given 0 scripts. To continue your aftercare when you leave the hospital, you may receive an automated call from our care team to check in on how you are doing. This is a free service and part of our promise to provide the best care and service to meet your aftercare needs.  If you have questions, or wish to unsubscribe from this service please call [phone]. Thank you for Choosing our [address] [place] [address] Emergency Department.

## 2021-04-19 NOTE — DISCHARGE INSTRUCTIONS
Ice and elevate arm. Take Tylenol Motrin for pain. Follow-up with your doctor or return to emergency department in 1 week for staple removal.  Return to the emergency department for drainage, warmth, redness or any other signs of infection or any other concerns.

## 2021-04-19 NOTE — ED TRIAGE NOTES
Pt states she was moving a refrigerator and it fell on her cutting her left wrist. Bleeding controlled at this time. Masked for triage.

## 2021-04-19 NOTE — ED PROVIDER NOTES
[address] Luis Garcia is a 53 y.o. female seen on 4/18/2021 in the [facility] EMERGENCY DEPT in room HG/G. Chief Complaint   Patient presents with    Laceration     HPI: 58-year-old -American female presented emergency department with complaints of right forearm laceration and pain secondary to an injury that occurred just prior to arrival.  Patient states that she was holding open the screen door while the family was moving a refrigerator. Patient states that they were trying to get through the door they lost control of the refrigerator and it fell with the corner of the refrigerator striking them patient in the right forearm. Patient states that she did not get pinned or crushed by the refrigerator but that it just struck her as it was falling. Patient with mild tenderness of her right forearm. She has abrasions and a laceration noted. She is right-hand dominant. Patient does not recall when her last tetanus shot was. She has no hand or wrist or elbow pain. Bleeding is controlled at this time. She has no other injuries or complaints. Historian: Patient    REVIEW OF SYSTEMS     Review of Systems   Constitutional: Negative. HENT: Negative. Respiratory: Negative. Cardiovascular: Negative. Gastrointestinal: Negative. Musculoskeletal:        Right forearm pain   Skin: Positive for wound. Neurological: Negative. All other systems reviewed and are negative.       PAST MEDICAL HISTORY     Past Medical History:   Diagnosis Date    Acute respiratory disease due to COVID-19 virus June-July 2020    Anemia     Anxiety and depression     Asthma     Colon polyps     Constipation     Hypertension     Insomnia     Obstructive sleep apnea on CPAP     Postsurgical hypothyroidism     Primary hyperparathyroidism ([address]     Thromboembolus ([address] 1990    during pregnancy, right breast    Type 2 diabetes mellitus ([address]      Past Surgical History:   Procedure Laterality Date    HX ANKLE FRACTURE TX Left     HX  SECTION      HX HYSTERECTOMY  2006    ovaries intact    HX PARATHYROIDECTOMY  2007    HX PARATHYROIDECTOMY Left 2021    HX PARTIAL THYROIDECTOMY Right     [place] BREAST SURGERY PROCEDURE UNLISTED Right     removal of \"clot\" from breast- right    [place] CHEST SURGERY PROCEDURE UNLISTED      bronchoscopy & mediastinotomy     Social History     Socioeconomic History    Marital status: SINGLE     Spouse name: Not on file    Number of children: Not on file    Years of education: Not on file    Highest education level: Not on file   Tobacco Use    Smoking status: Never Smoker    Smokeless tobacco: Never Used   Substance and Sexual Activity    Alcohol use: Yes     Alcohol/week: 0.0 standard drinks     Comment: occasional: once a month    Drug use: No   Social History Narrative    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 Taking? ALPRAZolam (XANAX) 0.5 mg tablet   No No   Sig: Take 1 Tab by mouth two (2) times daily as needed for Anxiety. Max Daily Amount: 1 mg. Blood-Glucose Meter monitoring kit   No No   Sig: Accu-chek Guide Monitor System or any meter insurance will cover check BS BID DX:E11.9   Calcium-Cholecalciferol, D3, 500 mg(1,250mg) -400 unit tab   No No   Sig: Take 1 Tab by mouth daily. acetaminophen (TYLENOL) 500 mg tablet   No No   Sig: Take 1 Tab by mouth every six (6) hours as needed for Pain. albuterol (PROVENTIL HFA, VENTOLIN HFA, PROAIR HFA) 90 mcg/actuation inhaler   No No   Sig: Take 1 Puff by inhalation every four (4) hours as needed for Wheezing. amLODIPine (NORVASC) 10 mg tablet   No No   Sig: Take 1 Tab by mouth daily. atorvastatin (LIPITOR) 10 mg tablet   No No   Sig: Take 1 Tab by mouth nightly.    buPROPion XL (WELLBUTRIN XL) 150 mg tablet   No No   Sig: Take 1 Tab by mouth every morning. cetirizine (ZYRTEC) 10 mg tablet   No No   Sig: Take 1 Tab by mouth daily. Patient taking differently: Take 10 mg by mouth as needed. citalopram (CELEXA) 20 mg tablet   No No   Sig: Take 1 Tab by mouth daily. cyclobenzaprine (FLEXERIL) 10 mg tablet   No No   Sig: Take 1 Tab by mouth three (3) times daily as needed for Muscle Spasm(s). ergocalciferol (ERGOCALCIFEROL) 1,250 mcg (50,000 unit) capsule   No No   Sig: Take 1 Cap by mouth two (2) times a week. ertugliflozin (STEGLATRO) 5 mg tab   No No   Sig: Take 5 mg by mouth daily. fluticasone propionate (FLONASE) 50 mcg/actuation nasal spray   No No   Si Sprays by Both Nostrils route daily. Patient taking differently: 2 Sprays by Both Nostrils route as needed. glucose blood VI test strips (ASCENSIA AUTODISC VI, ONE TOUCH ULTRA TEST VI) strip   No No   Sig: Accu-chek Erika Plus test strips or any test strips insurance will cover. Test BID prn. Dx:E11.9   hydrALAZINE (APRESOLINE) 50 mg tablet   No No   Sig: Take 1 Tab by mouth three (3) times daily. Patient taking differently: Take 50 mg by mouth two (2) times a day. hydroCHLOROthiazide (HYDRODIURIL) 25 mg tablet   No No   Sig: Take 1 Tab by mouth daily. lancets misc   No No   Sig: Accu-chek Guide fastclix lancet drums; Check BS BID; Dx:E11.9   levothyroxine (SYNTHROID) 88 mcg tablet   No No   Sig: Take 1 Tab by mouth Daily (before breakfast). lubiPROStone (AMITIZA) 8 mcg capsule   Yes No   Sig: Take 8 mcg by mouth as needed. metFORMIN (GLUCOPHAGE) 500 mg tablet   No No   Sig: Take 2 Tabs by mouth nightly. nystatin (MYCOSTATIN) topical cream   No No   Sig: Apply  to affected area two (2) times a day. phentermine (ADIPEX-P) 37.5 mg tablet   Yes No   Sig: TAKE ONE TABLET BY MOUTH EVERY MORNING   traMADoL (ULTRAM) 50 mg tablet   Yes No   Sig: Take 50 mg by mouth every six (6) hours as needed for Pain.       Facility-Administered Medications: None     Allergies   Allergen Reactions  Definity [Perflutren Lipid Microspheres] Other (comments)     Diffuse paresthesias.  Other Medication Other (comments)     Unknown med given at cardiology -Possible dye ( BODY WENT NUMB ALL OVER)        PHYSICAL EXAM       Vitals:    04/18/21 2124 04/18/21 2241   BP: (!) 153/100    Pulse: 84    Resp: 16    Temp: 98.6 °F (37 °C)    SpO2: 97% 97%    Vital signs were reviewed. Physical Exam  Vitals signs and nursing note reviewed. Constitutional:       General: She is not in acute distress. Appearance: Normal appearance. HENT:      Head: Normocephalic and atraumatic. Mouth/Throat:      Mouth: Mucous membranes are moist.   Cardiovascular:      Rate and Rhythm: Normal rate and regular rhythm. Pulmonary:      Effort: Pulmonary effort is normal.   Musculoskeletal:         General: Swelling, tenderness and signs of injury present. Comments: Tenderness and swelling distal forearm with 2 cm laceration without active bleeding. Skin:     General: Skin is warm and dry. Neurological:      General: No focal deficit present. Mental Status: She is alert and oriented to person, place, and time. Psychiatric:         Mood and Affect: Mood normal.         Behavior: Behavior normal.         Thought Content: Thought content normal.         Judgment: Judgment normal.            MEDICAL DECISION MAKING     ED Course:    No results found for this or any previous visit (from the past 8 hour(s)). Xr Forearm Rt Ap/lat    Result Date: 4/18/2021  Clinical history: Injury to the forearm. Pain. TECHNIQUE: 2 views of the right forearm. FINDINGS: Radius and ulna are intact. There is no acute fracture or dislocation. Alignment is maintained. No radiopaque foreign body is seen in the soft tissue. 1. No fracture.       MDM  Number of Diagnoses or Management Options  Contusion of right forearm, initial encounter  Forearm laceration, right, initial encounter  Diagnosis management comments: 43-year-old woman female with right forearm injury. Patient x-ray negative for acute fracture. Patient's forearm laceration repaired as below. Amount and/or Complexity of Data Reviewed  Tests in the radiology section of CPT®: ordered and reviewed    Patient Progress  Patient progress: improved       Wound Repair    Date/Time: 4/18/2021 11:16 PM  Location details: right arm  Wound length:2.5 cm or less    Anesthesia:  Local Anesthetic: LET (lido, epi, tetracaine)  Foreign bodies: no foreign bodies  Irrigation solution: tap water  Irrigation method: tap  Debridement: none  Skin closure: staples  Number of sutures: 2  Technique: simple  Approximation: close  My total time at bedside, performing this procedure was 1-15 minutes. Disposition: Discharged  Diagnosis:     ICD-10-CM ICD-9-CM   1. Forearm laceration, right, initial encounter  S51.811A 881.00   2. Contusion of right forearm, initial encounter  S50. 11XA 923.10     ____________________________________________________________________  A portion of this note was generated using voice recognition dictation software. While the note has been reviewed for accuracy, please note certain words and phrases may not be transcribed as intended and some grammatical and/or typographical errors may be present.

## 2021-05-07 NOTE — PROGRESS NOTES
Per [facility] scheduler called stating patient would like to reschedule procedure from Monday 5/10 to Monday 6/14 at 1400. Later appointment time needed due to work.

## 2021-06-11 NOTE — PROGRESS NOTES
Confirmed arrival time with patient, for patient to arrive at [address] [place] [address] and to [place] on 2nd floor in Radiology registrations. NPO prior to procedure. No concerns noted at this time.

## 2021-06-14 ENCOUNTER — HOSPITAL ENCOUNTER (OUTPATIENT)
Age: 51
Setting detail: OUTPATIENT SURGERY
Discharge: HOME | End: 2021-06-14
Attending: SURGERY | Admitting: SURGERY
Payer: COMMERCIAL

## 2021-06-14 VITALS — WEIGHT: 245 LBS | HEIGHT: 65 IN | RESPIRATION RATE: 18 BRPM | BODY MASS INDEX: 40.82 KG/M2

## 2021-06-14 PROCEDURE — 2709999900 HC NON-CHARGEABLE SUPPLY: Performed by: SURGERY

## 2021-06-14 PROCEDURE — 91010 ESOPHAGUS MOTILITY STUDY: CPT | Performed by: SURGERY

## 2021-06-14 PROCEDURE — 74011000250 HC RX REV CODE- 250: Performed by: SURGERY

## 2021-06-14 RX ORDER — LIDOCAINE HYDROCHLORIDE 20 MG/ML
15 SOLUTION OROPHARYNGEAL AS NEEDED
Status: DISCONTINUED | OUTPATIENT
Start: 2021-06-14 | End: 2021-06-14 | Stop reason: HOSPADM

## 2021-06-14 RX ADMIN — LIDOCAINE HYDROCHLORIDE 15 ML: 20 SOLUTION ORAL; TOPICAL at 14:00

## 2021-06-15 NOTE — PROCEDURES
[facility]  PROCEDURE NOTE    Name:  Buster Cervantes  MR#:  [id number]  :  1970  ACCOUNT #:  [de-identified]  DATE OF SERVICE:  2021    PREOPERATIVE DIAGNOSIS:  Bariatric workup. POSTOPERATIVE DIAGNOSIS:  Normal study. PROCEDURE PERFORMED:  High-resolution impedance manometry. PROCEDURE:  After obtaining informed consent, the patient underwent nasal intubation with the high-resolution impedance manometry probe. The lower esophageal sphincter was located between 37 and 41 cm. The resting pressure at the LES mid respiration was 27 mmHg and the IRP was 6 indicating normal contractility at rest and normal relaxation with wet swallows. The patient had 90% of wet swallows completely normal, 1 was hypercontractile. The average DCI was 5414. There was normal peristalsis without peristaltic breaks or premature contractions. Impedance data showed 80% of liquid swallows with a complete transit through to the stomach, 60% of viscous swallows which is slightly low. Overall, this is a normal study.       Roland Fox MD      [id number]  D:  06/15/2021 8:24  T:  06/15/2021 13:19  JOB #:  [id number]  CC:  MD Pineda Erwin MD

## 2021-07-06 ENCOUNTER — HOSPITAL ENCOUNTER (OUTPATIENT)
Dept: PHYSICAL THERAPY | Age: 51
Discharge: HOME | End: 2021-07-06
Attending: SURGERY
Payer: COMMERCIAL

## 2021-07-06 DIAGNOSIS — E66.01 MORBID OBESITY (HCC): ICD-10-CM

## 2021-07-06 PROCEDURE — 97110 THERAPEUTIC EXERCISES: CPT

## 2021-07-06 PROCEDURE — 97161 PT EVAL LOW COMPLEX 20 MIN: CPT

## 2021-07-06 NOTE — PROGRESS NOTES
Leanne Marie  : 1970  Payor: Alejandra / Plan: [address] [place] [address] Avenue / Product Type: Managed Care Medicaid /  [address]  at [facility] [address]  [address] [facility], [address], [place] [address].  [phone]   [phone]       OUTPATIENT PHYSICAL THERAPY: Daily Treatment Note 2021  Visit Count: 1     ICD-10: Treatment Diagnosis: morbid obesity (E66.01), right knee pain (M25.561), left knee pain (M25. 562)  Precautions/Allergies: none  Definity [perflutren lipid microspheres] and Other medication   TREATMENT PLAN:  Effective Dates: 2021 TO 2021 (30 days). Frequency/Duration: 2 times a week for 30 Day(s) MEDICAL/REFERRING DIAGNOSIS:  Morbid obesity ([address] [E66.01]   DATE OF ONSET: 1 year  REFERRING PHYSICIAN: Juli Chan*  MD Orders: PT- evaluate and treat  Return MD Appointment: surgery not scheduled       Pre-treatment Symptoms/Complaints:  Pt complains of B knee pain with the right being worse than the left. Pain: Initial:   2-3/10 Post Session:  No complaints of pain or number given   Medications Last Reviewed:  21    Updated Objective Findings:   See evaluation     TREATMENT:   Therapeutic Exercise: (20 Minutes):  Exercises to improve mobility and strength. Required maximal verbal and tactile cues to promote proper body alignment, promote proper body posture and core activation and exercise technique. Treatment/Session Summary:    · Response to Treatment:  Good return demonstration of exercises. Tight quads contributing to knee pain. · Communication/Consultation:  None today  · Equipment provided today:  None today  · Recommendations/Intent for next treatment session: Next visit will focus on stretching and exercise progression.     Total Treatment Billable Duration:  20 minutes win addition to evaluation  PT Patient Time In/Time Out  Time In: 1100  Time Out: 287 Maria E Lorenzo PT    Future Appointments   Date Time Provider Mckinley Schneider   7/9/2021 11:30 AM CSAE DIETICIAN CSAE CSAE   10/1/2021  3:45 PM Yue Hoover, DO SSA UCD[facility] UCD   12/6/2021  3:20 PM Michelle Wright MD POAG POA   12/30/2021  3:45 PM ENDO NURSE END BS ENDO   1/7/2022  1:45 PM Ceci Washington MD END BS ENDO

## 2021-07-06 NOTE — THERAPY EVALUATION
Zaid Cadet  : 1970  Primary: [facility]  Secondary:  [address]  at [facility]  [address] [place] [address],  Jing Ross.  [phone]   [phone]       OUTPATIENT PHYSICAL THERAPY:Initial Assessment 2021   ICD-10: Treatment Diagnosis: morbid obesity (E66.01), right knee pain (M25.561), left knee pain (M25. 562)  Precautions/Allergies:   Definity [perflutren lipid microspheres] and Other medication   TREATMENT PLAN:  Effective Dates: 2021 TO 2021 (30 days). Frequency/Duration: 1 time a week for 30 Day(s) MEDICAL/REFERRING DIAGNOSIS:  Morbid obesity ([address] [E66.01]   DATE OF ONSET: 1 year  REFERRING PHYSICIAN: Jay Anthony*  MD Orders: PT- evaluate and treat  Return MD Appointment: surgery not scheduled     INITIAL ASSESSMENT:  Ms. Urvashi Issa comes to therapy for pre bariatric surgery exercises. She states that knee pain limited her ability to exercise. Although AROM is within normal limits, she presents with bilateral knee tightness with the right more so than the left that is likely contributing to her pain. Strength is within normal limits except for a very weak core/trunk. She appears motivated to participate and demonstrated good technique with the home program issued. She would benefit from guided progression of exercises to reduce risk of aggravating her knees and prepare her for return to work. PROBLEM LIST (Impacting functional limitations):  1. Decreased Strength  2. Decreased ADL/Functional Activities  3. Decreased Balance  4. Increased Pain  5. Decreased Activity Tolerance  6. Decreased Flexibility/Joint Mobility INTERVENTIONS PLANNED: (Treatment may consist of any combination of the following)  1. Home Exercise Program (HEP)  2. Neuromuscular Re-education/Strengthening     GOALS: (Goals have been discussed and agreed upon with patient.)  Discharge Goals: Time Frame: 30 days  1. Independent with HEP-met  2.  Tolerate 45 minutes of exercise without increasing knee pain    OUTCOME MEASURE:   Tool Used: Lower Extremity Functional Scale (LEFS)  Score:  Initial: 53/80 Most Recent: X/80 (Date: -- )   Interpretation of Score: 20 questions each scored on a 5 point scale with 0 representing \"extreme difficulty or unable to perform\" and 4 representing \"no difficulty\". The lower the score, the greater the functional disability. 80/80 represents no disability. Minimal detectable change is 9 points. MEDICAL NECESSITY:   · Skilled intervention continues to be required due to need for guided progression of exercise. REASON FOR SERVICES/OTHER COMMENTS:  · Patient continues to require skilled intervention due to need for pt to tolerate exercise and standing activity to prepare for return to work. Total Duration: 50 minutes  PT Patient Time In/Time Out  Time In: 1100  Time Out: 1150    Rehabilitation Potential For Stated Goals: Good  Regarding [facility] therapy, I certify that the treatment plan above will be carried out by a therapist or under their direction. Thank you for this referral,  Cara Barry PT     Referring Physician Signature: Shara Jolly* No Signature is Required for this note. PAIN/SUBJECTIVE:   Initial:   2-3/10 knees Post Session:  No increase   HISTORY:   History of Injury/Illness (Reason for Referral):  Pt reports attempting to exercise for continued weight loss but knee pain limited progression. She desires to have the surgery and return to work  Past Medical History/Comorbidities:   Ms. Mila Alexander  has a past medical history of Acute respiratory disease due to COVID-19 virus (June-July 2020), Anemia, Anxiety and depression, Asthma, Colon polyps, Constipation, Hypertension, Insomnia, Obstructive sleep apnea on CPAP, Postsurgical hypothyroidism, Primary hyperparathyroidism ([address] Thromboembolus ([address] (1990), and Type 2 diabetes mellitus ([facility]   Ms. Mila Alexander  has a past surgical history that includes hx partial thyroidectomy (Right, ); hx parathyroidectomy (); hx hysterectomy (); hx  section; pr breast surgery procedure unlisted (Right, ); pr chest surgery procedure unlisted; hx ankle fracture tx (Left, ); and hx parathyroidectomy (Left, 2021). Social History/Living Environment:     lives with teenage son in a 2nd floor apt. Daughter close by to help  Prior Level of Function/Work/Activity:  Looking for job that is more sedentary  Personal Factors: Other factors that influence how disability is experienced by the patient:  Knee pain while negotiating stairs to home apartment   Ambulatory/Rehab Services H2 Model Falls Risk Assessment   Risk Factors:       No Risk Factors Identified Ability to Rise from Chair:       (0)  Ability to rise in a single movement   Falls Prevention Plan:       No modifications necessary   Total: (5 or greater = High Risk): 0    [facility]. [facility] Patent #[id number]. Federal Law prohibits the replication, distribution or use without written permission from [facility] [employer]   Current Medications:       Current Outpatient Medications:     mirtazapine (REMERON) 7.5 mg tablet, Take 7.5 mg by mouth., Disp: , Rfl:     isosorbide mononitrate ER (IMDUR) 30 mg tablet, Take 30 mg by mouth daily. , Disp: , Rfl:     indomethacin (INDOCIN) 50 mg capsule, TAKE ONE CAPSULE BY MOUTH TWICE A DAY, Disp: , Rfl:     ergocalciferol (ERGOCALCIFEROL) 1,250 mcg (50,000 unit) capsule, Take 1 Capsule by mouth two (2) times a week., Disp: 26 Capsule, Rfl: 3    Calcium-Cholecalciferol, D3, 500 mg(1,250mg) -400 unit tab, Take 1 Tablet by mouth daily. , Disp: 30 Tablet, Rfl: 11    levothyroxine (SYNTHROID) 88 mcg tablet, Take 1 Tablet by mouth Daily (before breakfast). , Disp: 90 Tablet, Rfl: 3    atorvastatin (LIPITOR) 10 mg tablet, Take 1 Tab by mouth nightly., Disp: 90 Tab, Rfl: 3    hydroCHLOROthiazide (HYDRODIURIL) 25 mg tablet, Take 1 Tab by mouth daily. , Disp: 30 Tab, Rfl: 5    traMADoL (ULTRAM) 50 mg tablet, Take 50 mg by mouth every six (6) hours as needed for Pain., Disp: , Rfl:     hydrALAZINE (APRESOLINE) 50 mg tablet, Take 1 Tab by mouth three (3) times daily. (Patient taking differently: Take 50 mg by mouth two (2) times a day.), Disp: 90 Tab, Rfl: 5    ALPRAZolam (XANAX) 0.5 mg tablet, Take 1 Tab by mouth two (2) times daily as needed for Anxiety. Max Daily Amount: 1 mg., Disp: 60 Tab, Rfl: 1    amLODIPine (NORVASC) 10 mg tablet, Take 1 Tab by mouth daily. , Disp: 30 Tab, Rfl: 11    lubiPROStone (AMITIZA) 8 mcg capsule, Take 8 mcg by mouth as needed. , Disp: , Rfl:     phentermine (ADIPEX-P) 37.5 mg tablet, TAKE ONE TABLET BY MOUTH EVERY MORNING, Disp: , Rfl:     buPROPion XL (WELLBUTRIN XL) 150 mg tablet, Take 1 Tab by mouth every morning., Disp: 90 Tab, Rfl: 3    citalopram (CELEXA) 20 mg tablet, Take 1 Tab by mouth daily. , Disp: 30 Tab, Rfl: 5    metFORMIN (GLUCOPHAGE) 500 mg tablet, Take 2 Tabs by mouth nightly., Disp: 60 Tab, Rfl: 5    albuterol (PROVENTIL HFA, VENTOLIN HFA, PROAIR HFA) 90 mcg/actuation inhaler, Take 1 Puff by inhalation every four (4) hours as needed for Wheezing., Disp: 1 Inhaler, Rfl: 5    cyclobenzaprine (FLEXERIL) 10 mg tablet, Take 1 Tab by mouth three (3) times daily as needed for Muscle Spasm(s). , Disp: 30 Tab, Rfl: 2    nystatin (MYCOSTATIN) topical cream, Apply  to affected area two (2) times a day., Disp: 60 g, Rfl: 1    glucose blood VI test strips (ASCENSIA AUTODISC VI, ONE TOUCH ULTRA TEST VI) strip, Accu-chek Erika Plus test strips or any test strips insurance will cover. Test BID prn. Dx:E11.9, Disp: 200 Strip, Rfl: 11    fluticasone propionate (FLONASE) 50 mcg/actuation nasal spray, 2 Sprays by Both Nostrils route daily.  (Patient taking differently: 2 Sprays by Both Nostrils route as needed.), Disp: 1 Bottle, Rfl: 11    ertugliflozin (STEGLATRO) 5 mg tab, Take 5 mg by mouth daily. , Disp: 30 Tab, Rfl: 5    lancets misc, Accu-chek Guide fastclix lancet drums; Check BS BID; Dx:E11.9, Disp: 200 Each, Rfl: 11    cetirizine (ZYRTEC) 10 mg tablet, Take 1 Tab by mouth daily. (Patient taking differently: Take 10 mg by mouth as needed.), Disp: 90 Tab, Rfl: 3    acetaminophen (TYLENOL) 500 mg tablet, Take 1 Tab by mouth every six (6) hours as needed for Pain., Disp: 90 Tab, Rfl: 1    Blood-Glucose Meter monitoring kit, Accu-chek Guide Monitor System or any meter insurance will cover check BS BID DX:E11.9, Disp: 1 Kit, Rfl: 0   Date Last Reviewed:  7/6/21   Number of Personal Factors/Comorbidities that affect the Plan of Care: 1-2: MODERATE COMPLEXITY   EXAMINATION:   Observation/Orthostatic Postural Assessment:          Slow transitional movements. Knees without swelling. Excessive lumbar lordosis   Palpation:          Tenderness over medial and lateral knee joint  ROM:          Trunk: WFL        R knee: WFL. Very tight quads        L knee: WFL. Tight quads  Strength:          BUE/LE WFL. Core: 3+ to 4-/5    Body Structures Involved:  1. Joints  2. Muscles Body Functions Affected:  1. Sensory/Pain  2. Neuromusculoskeletal  3. Movement Related Activities and Participation Affected:  1. General Tasks and Demands  2. Mobility  3.  Domestic Life   Number of elements (examined above) that affect the Plan of Care: 1-2: LOW COMPLEXITY   CLINICAL PRESENTATION:   Presentation: Stable and uncomplicated: LOW COMPLEXITY   CLINICAL DECISION MAKING:   Use of outcome tool(s) and clinical judgement create a POC that gives a: Clear prediction of patient's progress: LOW COMPLEXITY

## 2021-07-15 ENCOUNTER — APPOINTMENT (OUTPATIENT)
Dept: ULTRASOUND IMAGING | Age: 51
End: 2021-07-15
Attending: EMERGENCY MEDICINE
Payer: COMMERCIAL

## 2021-07-15 ENCOUNTER — HOSPITAL ENCOUNTER (EMERGENCY)
Age: 51
Discharge: HOME | End: 2021-07-15
Attending: EMERGENCY MEDICINE
Payer: COMMERCIAL

## 2021-07-15 VITALS
WEIGHT: 239 LBS | BODY MASS INDEX: 39.82 KG/M2 | HEART RATE: 69 BPM | SYSTOLIC BLOOD PRESSURE: 153 MMHG | DIASTOLIC BLOOD PRESSURE: 67 MMHG | OXYGEN SATURATION: 98 % | HEIGHT: 65 IN | RESPIRATION RATE: 18 BRPM | TEMPERATURE: 98.2 F

## 2021-07-15 DIAGNOSIS — E87.6 HYPOKALEMIA: ICD-10-CM

## 2021-07-15 DIAGNOSIS — M71.21 SYNOVIAL CYST OF RIGHT POPLITEAL SPACE: Primary | ICD-10-CM

## 2021-07-15 LAB
ALBUMIN SERPL-MCNC: 3.4 G/DL (ref 3.5–5)
ALBUMIN/GLOB SERPL: 0.8 {RATIO} (ref 1.2–3.5)
ALP SERPL-CCNC: 113 U/L (ref 50–130)
ALT SERPL-CCNC: 18 U/L (ref 12–65)
ANION GAP SERPL CALC-SCNC: 5 MMOL/L (ref 7–16)
AST SERPL-CCNC: 15 U/L (ref 15–37)
BASOPHILS # BLD: 0 K/UL (ref 0–0.2)
BASOPHILS NFR BLD: 1 % (ref 0–2)
BILIRUB SERPL-MCNC: 0.5 MG/DL (ref 0.2–1.1)
BUN SERPL-MCNC: 12 MG/DL (ref 6–23)
CALCIUM SERPL-MCNC: 10.2 MG/DL (ref 8.3–10.4)
CHLORIDE SERPL-SCNC: 98 MMOL/L (ref 98–107)
CO2 SERPL-SCNC: 38 MMOL/L (ref 21–32)
CREAT SERPL-MCNC: 0.59 MG/DL (ref 0.6–1)
D DIMER PPP FEU-MCNC: <0.27 UG/ML(FEU)
DIFFERENTIAL METHOD BLD: NORMAL
EOSINOPHIL # BLD: 0.2 K/UL (ref 0–0.8)
EOSINOPHIL NFR BLD: 3 % (ref 0.5–7.8)
ERYTHROCYTE [DISTWIDTH] IN BLOOD BY AUTOMATED COUNT: 14.2 % (ref 11.9–14.6)
GLOBULIN SER CALC-MCNC: 4.4 G/DL (ref 2.3–3.5)
GLUCOSE SERPL-MCNC: 108 MG/DL (ref 65–100)
HCT VFR BLD AUTO: 42.8 % (ref 35.8–46.3)
HGB BLD-MCNC: 13.7 G/DL (ref 11.7–15.4)
IMM GRANULOCYTES # BLD AUTO: 0 K/UL (ref 0–0.5)
IMM GRANULOCYTES NFR BLD AUTO: 0 % (ref 0–5)
LYMPHOCYTES # BLD: 2.3 K/UL (ref 0.5–4.6)
LYMPHOCYTES NFR BLD: 40 % (ref 13–44)
MCH RBC QN AUTO: 27.9 PG (ref 26.1–32.9)
MCHC RBC AUTO-ENTMCNC: 32 G/DL (ref 31.4–35)
MCV RBC AUTO: 87.2 FL (ref 79.6–97.8)
MONOCYTES # BLD: 0.4 K/UL (ref 0.1–1.3)
MONOCYTES NFR BLD: 8 % (ref 4–12)
NEUTS SEG # BLD: 2.8 K/UL (ref 1.7–8.2)
NEUTS SEG NFR BLD: 48 % (ref 43–78)
NRBC # BLD: 0 K/UL (ref 0–0.2)
PLATELET # BLD AUTO: 399 K/UL (ref 150–450)
PMV BLD AUTO: 9.5 FL (ref 9.4–12.3)
POTASSIUM SERPL-SCNC: 2.5 MMOL/L (ref 3.5–5.1)
PROT SERPL-MCNC: 7.8 G/DL (ref 6.3–8.2)
RBC # BLD AUTO: 4.91 M/UL (ref 4.05–5.2)
SODIUM SERPL-SCNC: 141 MMOL/L (ref 136–145)
WBC # BLD AUTO: 5.7 K/UL (ref 4.3–11.1)

## 2021-07-15 PROCEDURE — 93971 EXTREMITY STUDY: CPT

## 2021-07-15 PROCEDURE — 85025 COMPLETE CBC W/AUTO DIFF WBC: CPT

## 2021-07-15 PROCEDURE — 80053 COMPREHEN METABOLIC PANEL: CPT

## 2021-07-15 PROCEDURE — 85379 FIBRIN DEGRADATION QUANT: CPT

## 2021-07-15 PROCEDURE — 99283 EMERGENCY DEPT VISIT LOW MDM: CPT

## 2021-07-15 NOTE — ED TRIAGE NOTES
Patient reports right lower leg pain and swelling seen by urgent care and told to come to ED for r/o blood clot.  Masked

## 2021-07-16 NOTE — ED PROVIDER NOTES
80-year-old -American female presents with swelling to her right leg which began yesterday evening. No known injury. Does report chronic leg pain attributed to arthritis. She states this pain is different. No focal pain. The pain extends from her right knee down through her foot. It is aggravated by ambulation. No chest pain, fever, shortness of breath. The history is provided by the patient. Leg Pain   Pertinent negatives include no back pain and no neck pain.         Past Medical History:   Diagnosis Date    Acute respiratory disease due to COVID-19 virus -2020    Anemia     Anxiety and depression     Asthma     Colon polyps     Constipation     Hypertension     Insomnia     Obstructive sleep apnea on CPAP     Postsurgical hypothyroidism     Primary hyperparathyroidism ([address]     Thromboembolus ([address]     during pregnancy, right breast    Type 2 diabetes mellitus ([address]        Past Surgical History:   Procedure Laterality Date    HX ANKLE FRACTURE TX Left 2010    HX  SECTION      HX HYSTERECTOMY  2006    ovaries intact    HX PARATHYROIDECTOMY  2007    HX PARATHYROIDECTOMY Left 2021    HX PARTIAL THYROIDECTOMY Right     LA BREAST SURGERY PROCEDURE UNLISTED Right     removal of \"clot\" from breast- right    LA CHEST SURGERY PROCEDURE UNLISTED      bronchoscopy & mediastinotomy         Family History:   Problem Relation Age of Onset    Thyroid Disease Mother         hypothyroidism    Cancer Father     Thyroid Disease Maternal Aunt         hypothyroidism    Thyroid Disease Maternal Uncle         hypothyroidism    Breast Cancer Neg Hx        Social History     Socioeconomic History    Marital status: SINGLE     Spouse name: Not on file    Number of children: Not on file    Years of education: Not on file    Highest education level: Not on file   Occupational History    Not on file   Tobacco Use    Smoking status: Never Smoker    Smokeless tobacco: Never Used   Substance and Sexual Activity    Alcohol use: Yes     Alcohol/week: 0.0 standard drinks     Comment: occasional: once a month    Drug use: No    Sexual activity: Not on file   Other Topics Concern    Not on file   Social History Narrative    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, works in customer service-desk job     Social Determinants of Health     Financial Resource Strain:     Difficulty of Paying Living Expenses:    Food Insecurity:     Worried About [address] in the Last Year:    [address] in the Last Year:    Transportation Needs:     Lack of Transportation (Medical):  Lack of Transportation (Non-Medical):    Physical Activity:     Days of Exercise per Week:     Minutes of Exercise per Session:    Stress:     Feeling of Stress :    Social Connections:     Frequency of Communication with Friends and Family:     Frequency of Social Gatherings with Friends and Family:     Attends Worship Services:     Active Member of Clubs or Organizations:     Attends Club or Organization Meetings:     Marital Status:    Intimate Partner Violence:     Fear of Current or Ex-Partner:     Emotionally Abused:     Physically Abused:     Sexually Abused: ALLERGIES: Definity [perflutren lipid microspheres] and Other medication    Review of Systems   Constitutional: Negative for fever. Respiratory: Negative for shortness of breath. Gastrointestinal: Negative for abdominal pain. Musculoskeletal: Negative for back pain and neck pain. Neurological: Negative for headaches. Vitals:    07/15/21 1741   BP: 135/81   Pulse: 82   Resp: 18   Temp: 98.2 °F (36.8 °C)   SpO2: 97%   Weight: 108.4 kg (239 lb)   Height: 5' 5\" (1.651 m)            Physical Exam  Vitals and nursing note reviewed. Constitutional:       Appearance: Normal appearance. HENT:      Head: Normocephalic and atraumatic. Cardiovascular:      Rate and Rhythm: Normal rate.    Pulmonary:      Effort: Pulmonary effort is normal.   Musculoskeletal:      Cervical back: Normal range of motion. Comments: Mild swelling to right lower extremity from knee down. No pitting edema. No calf pain or cords. Good distal pulses sensation and movement. No erythema. Skin:     General: Skin is warm and dry. Neurological:      Mental Status: She is alert and oriented to person, place, and time. Psychiatric:         Mood and Affect: Mood normal.         Behavior: Behavior normal.          MDM  Number of Diagnoses or Management Options  Diagnosis management comments: Blood work is unremarkable except for mild hypokalemia at 2.5. Ultrasound shows no evidence of DVT but does show a fluid collection measuring 2.3 x 1 x 2.5 cm in the popliteal fossa suspected Baker's cyst.  Patient is nontoxic. She appears safe for discharge home. Advised her to take over-the-counter potassium supplement.   Will give referral to orthopedics for management of Baker's cyst.       Amount and/or Complexity of Data Reviewed  Clinical lab tests: ordered and reviewed  Tests in the radiology section of CPT®: ordered and reviewed  Independent visualization of images, tracings, or specimens: yes    Risk of Complications, Morbidity, and/or Mortality  Presenting problems: moderate  Diagnostic procedures: moderate  Management options: moderate           Procedures

## 2021-07-16 NOTE — ED NOTES
I have reviewed discharge instructions with the patient. The patient verbalized understanding. Patient left ED via Discharge Method: ambulatory to Home with (insert name of family/friend, self, transport). Opportunity for questions and clarification provided. Patient given 0 scripts. To continue your aftercare when you leave the hospital, you may receive an automated call from our care team to check in on how you are doing. This is a free service and part of our promise to provide the best care and service to meet your aftercare needs.  If you have questions, or wish to unsubscribe from this service please call [phone]. Thank you for Choosing our [facility] Emergency Department.

## 2021-10-11 ENCOUNTER — HOSPITAL ENCOUNTER (OUTPATIENT)
Dept: LAB | Age: 51
Discharge: HOME | End: 2021-10-11
Payer: COMMERCIAL

## 2021-10-11 DIAGNOSIS — E78.2 MIXED HYPERLIPIDEMIA: ICD-10-CM

## 2021-10-11 LAB
CHOLEST SERPL-MCNC: 191 MG/DL
HDLC SERPL-MCNC: 45 MG/DL (ref 40–60)
HDLC SERPL: 4.2 {RATIO}
LDLC SERPL CALC-MCNC: 129 MG/DL
TRIGL SERPL-MCNC: 85 MG/DL (ref 35–150)
VLDLC SERPL CALC-MCNC: 17 MG/DL (ref 6–23)

## 2021-10-11 PROCEDURE — 80061 LIPID PANEL: CPT

## 2021-10-11 PROCEDURE — 36415 COLL VENOUS BLD VENIPUNCTURE: CPT

## 2021-11-23 ENCOUNTER — HOSPITAL ENCOUNTER (EMERGENCY)
Age: 51
Discharge: HOME | End: 2021-11-23
Attending: EMERGENCY MEDICINE
Payer: COMMERCIAL

## 2021-11-23 VITALS
WEIGHT: 215 LBS | HEIGHT: 65 IN | OXYGEN SATURATION: 97 % | TEMPERATURE: 98.2 F | RESPIRATION RATE: 16 BRPM | DIASTOLIC BLOOD PRESSURE: 72 MMHG | SYSTOLIC BLOOD PRESSURE: 124 MMHG | BODY MASS INDEX: 35.82 KG/M2 | HEART RATE: 78 BPM

## 2021-11-23 DIAGNOSIS — J20.9 ACUTE BRONCHITIS WITH BRONCHOSPASM: Primary | ICD-10-CM

## 2021-11-23 PROCEDURE — 94664 DEMO&/EVAL PT USE INHALER: CPT

## 2021-11-23 PROCEDURE — 74011000250 HC RX REV CODE- 250: Performed by: EMERGENCY MEDICINE

## 2021-11-23 PROCEDURE — 94640 AIRWAY INHALATION TREATMENT: CPT

## 2021-11-23 PROCEDURE — 74011250637 HC RX REV CODE- 250/637: Performed by: EMERGENCY MEDICINE

## 2021-11-23 PROCEDURE — 99284 EMERGENCY DEPT VISIT MOD MDM: CPT

## 2021-11-23 PROCEDURE — 74011636637 HC RX REV CODE- 636/637: Performed by: EMERGENCY MEDICINE

## 2021-11-23 RX ORDER — BENZONATATE 100 MG/1
200 CAPSULE ORAL ONCE
Status: COMPLETED | OUTPATIENT
Start: 2021-11-23 | End: 2021-11-23

## 2021-11-23 RX ORDER — IPRATROPIUM BROMIDE AND ALBUTEROL SULFATE 2.5; .5 MG/3ML; MG/3ML
3 SOLUTION RESPIRATORY (INHALATION)
Status: COMPLETED | OUTPATIENT
Start: 2021-11-23 | End: 2021-11-23

## 2021-11-23 RX ADMIN — PREDNISONE 60 MG: 10 TABLET ORAL at 19:58

## 2021-11-23 RX ADMIN — BENZONATATE 200 MG: 100 CAPSULE ORAL at 19:58

## 2021-11-23 RX ADMIN — IPRATROPIUM BROMIDE AND ALBUTEROL SULFATE 3 ML: .5; 3 SOLUTION RESPIRATORY (INHALATION) at 19:48

## 2021-11-24 NOTE — DISCHARGE INSTRUCTIONS
Use spacer with inhaler. Refills of inhaler, prednisone and antibiotics were phoned in to your pharmacy by your primary care doctor. Pick them up in the morning. Medications should last you this evening. Recheck sooner for worse breathing or other concerns.

## 2021-11-24 NOTE — ED NOTES
I have reviewed discharge instructions with the patient. The patient verbalized understanding. Patient left ED via Discharge Method: ambulatory to Home with herself. Opportunity for questions and clarification provided. Patient given 0 scripts. To continue your aftercare when you leave the hospital, you may receive an automated call from our care team to check in on how you are doing. This is a free service and part of our promise to provide the best care and service to meet your aftercare needs.  If you have questions, or wish to unsubscribe from this service please call [phone]. Thank you for Choosing our [address] [place] [address] Emergency Department.

## 2021-11-24 NOTE — ED PROVIDER NOTES
26-year-old female states he has some mild URI symptoms 1 week ago. 4 days ago she started developing increasing cough with clear and occasional yellow sputum. Some slight soreness in her chest but no pleurisy. Some increasing shortness of breath and wheezing. No vomiting or diarrhea. No fever or back pain. No swelling in her legs. Patient has a history of hypertension and diabetes. Has a history of PE when she was pregnant years ago. Also has a history of reactive airway disease and her inhalers not been helping. Review of records reveals she called her doctor who has called in a prescription for an inhaler, prednisone, Zithromax. The history is provided by the patient. Cough  This is a new problem. The current episode started more than 2 days ago. The problem occurs every few minutes. The problem has been gradually worsening. The cough is productive of sputum. There has been no fever. Associated symptoms include chills, rhinorrhea, sore throat, myalgias, shortness of breath and wheezing. Pertinent negatives include no chest pain, no nausea and no vomiting. She has tried inhalers for the symptoms. She is not a smoker. Her past medical history is significant for bronchitis and asthma. Her past medical history does not include cancer, heart failure or CHF. Nasal Congestion  Associated symptoms include shortness of breath. Pertinent negatives include no chest pain.         Past Medical History:   Diagnosis Date    Acute respiratory disease due to COVID-19 virus June-July 2020    Anemia     Anxiety and depression     Asthma     Colon polyps     Constipation     Hypertension     Insomnia     Obstructive sleep apnea on CPAP     Postsurgical hypothyroidism     Primary hyperparathyroidism ([address]     Thromboembolus ([address] 1990    during pregnancy, right breast    Type 2 diabetes mellitus ([address]        Past Surgical History:   Procedure Laterality Date    HX ANKLE FRACTURE TX Left 2010    HX  SECTION      HX HYSTERECTOMY  2006    ovaries intact    HX PARATHYROIDECTOMY  2007    HX PARATHYROIDECTOMY Left 2021    HX PARTIAL THYROIDECTOMY Right     AL BREAST SURGERY PROCEDURE UNLISTED Right     removal of \"clot\" from breast- right    AL CHEST SURGERY PROCEDURE UNLISTED      bronchoscopy & mediastinotomy         Family History:   Problem Relation Age of Onset    Thyroid Disease Mother         hypothyroidism    Cancer Father     Thyroid Disease Maternal Aunt         hypothyroidism    Thyroid Disease Maternal Uncle         hypothyroidism    Breast Cancer Neg Hx        Social History     Socioeconomic History    Marital status: SINGLE     Spouse name: Not on file    Number of children: Not on file    Years of education: Not on file    Highest education level: Not on file   Occupational History    Not on file   Tobacco Use    Smoking status: Never Smoker    Smokeless tobacco: Never Used   Substance and Sexual Activity    Alcohol use: Yes     Alcohol/week: 0.0 standard drinks     Comment: occasional: once a month    Drug use: No    Sexual activity: Not on file   Other Topics Concern    Not on file   Social History Narrative    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, 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     Social Determinants of Health     Financial Resource Strain:     Difficulty of Paying Living Expenses: Not on [facility]:     Worried About [facility] in the Last Year: Not on file    [facility] in the Last Year: Not on file   Transportation Needs:     Lack of Transportation (Medical): Not on file    Lack of Transportation (Non-Medical):  Not on file   Physical Activity:     Days of Exercise per Week: Not on file    Minutes of Exercise per Session: Not on file   Stress:     Feeling of Stress : Not on file   Social Connections:     Frequency of Communication with Friends and Family: Not on file    Frequency of Social Gatherings with Friends and Family: Not on file    Attends Adventism Services: Not on file    Active Member of Clubs or Organizations: Not on file    Attends Club or Organization Meetings: Not on file    Marital Status: Not on file   Intimate Partner Violence:     Fear of Current or Ex-Partner: Not on file    Emotionally Abused: Not on file    Physically Abused: Not on file    Sexually Abused: Not on file   Housing Stability:     Unable to Pay for Housing in the Last Year: Not on file    Number of [place] in the Last Year: Not on file    Unstable Housing in the Last Year: Not on file         ALLERGIES: Definity [perflutren lipid microspheres] and Other medication    Review of Systems   Constitutional: Positive for chills. Negative for fever. HENT: Positive for rhinorrhea and sore throat. Respiratory: Positive for cough, shortness of breath and wheezing. Cardiovascular: Negative for chest pain. Gastrointestinal: Negative for nausea and vomiting. Musculoskeletal: Positive for myalgias. All other systems reviewed and are negative. Vitals:    11/23/21 1832 11/23/21 1938   BP: 127/79    Pulse: 91    Resp: 18    Temp: 98.1 °F (36.7 °C)    SpO2: 96% 96%   Weight: 97.5 kg (215 lb)    Height: 5' 5\" (1.651 m)             Physical Exam  Vitals and nursing note reviewed. Constitutional:       General: She is not in acute distress. Appearance: She is not ill-appearing. Eyes:      Conjunctiva/sclera: Conjunctivae normal.   Cardiovascular:      Rate and Rhythm: Normal rate and regular rhythm. Pulmonary:      Effort: Pulmonary effort is normal.      Breath sounds: Wheezing present. No rales. Skin:     General: Skin is warm and dry. Neurological:      Mental Status: She is alert. MDM  Number of Diagnoses or Management Options  Diagnosis management comments: Suspect URI with bronchospasm. Not hypoxemic. Nebulizer treatments. Start patient on spacer. Do not believe imaging needed.        Amount and/or Complexity of Data Reviewed  Review and summarize past medical records: yes    Risk of Complications, Morbidity, and/or Mortality  Presenting problems: moderate  Diagnostic procedures: minimal  Management options: low    Patient Progress  Patient progress: stable         Procedures

## 2021-12-15 ENCOUNTER — APPOINTMENT (OUTPATIENT)
Dept: GENERAL RADIOLOGY | Age: 51
End: 2021-12-15
Attending: EMERGENCY MEDICINE
Payer: COMMERCIAL

## 2021-12-15 PROCEDURE — 73562 X-RAY EXAM OF KNEE 3: CPT

## 2021-12-15 PROCEDURE — 73502 X-RAY EXAM HIP UNI 2-3 VIEWS: CPT

## 2021-12-15 PROCEDURE — 99282 EMERGENCY DEPT VISIT SF MDM: CPT

## 2021-12-16 ENCOUNTER — HOSPITAL ENCOUNTER (EMERGENCY)
Age: 51
Discharge: HOME | End: 2021-12-16
Attending: EMERGENCY MEDICINE
Payer: COMMERCIAL

## 2021-12-16 VITALS
SYSTOLIC BLOOD PRESSURE: 180 MMHG | BODY MASS INDEX: 35.81 KG/M2 | WEIGHT: 214.95 LBS | RESPIRATION RATE: 16 BRPM | HEART RATE: 70 BPM | DIASTOLIC BLOOD PRESSURE: 100 MMHG | TEMPERATURE: 98.1 F | HEIGHT: 65 IN | OXYGEN SATURATION: 97 %

## 2021-12-16 DIAGNOSIS — M25.462 BILATERAL KNEE EFFUSIONS: Primary | ICD-10-CM

## 2021-12-16 DIAGNOSIS — M25.461 BILATERAL KNEE EFFUSIONS: Primary | ICD-10-CM
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
## 2021-12-16 NOTE — ED TRIAGE NOTES
Pt states that she fell Saturday night at [facility] onto her right knee. C/o pain in the left lower leg, knee, right hip and lower back and right knee. Taking tylenol without relief.   Masked on arrival

## 2021-12-16 NOTE — ED NOTES
I have reviewed discharge instructions with the patient. The patient verbalized understanding. Mindy, self). ent left ED via Discharge Method: ambulatory to Home with/friend    Opportunity for questions and clarification provided. Patient given 0 scripts. Instructed the pt on wrapping her knees    To continue your aftercare when you leave the hospital, you may receive an automated call from our care team to check in on how you are doing. This is a free service and part of our promise to provide the best care and service to meet your aftercare needs.  If you have questions, or wish to unsubscribe from this service please call [phone]. Thank you for Choosing our [facility] Emergency Department.

## 2021-12-16 NOTE — DISCHARGE INSTRUCTIONS
Please use Ace wraps to both knees bilaterally continue the meloxicam your orthopedic doctor wrote for you and follow back up with them.

## 2021-12-16 NOTE — ED PROVIDER NOTES
Mask was worn during the entire patient examination. Jennifer Glass is a 49 y.o. female who presents to the ED with a chief complaint of fall. Patient states she fell several days ago on Saturday night. She was walking into a store stated it was raining and wet and she lost her balance. She has a history of joint problems specifically with her knees and hips that she has been seeing orthopedics for. She states the fall made her bilateral knee pain and hip pain worse. She has tried Tylenol without much relief. She was recently prescribed meloxicam but has not been taking that regularly. She has still been able to walk without any problems. The history is provided by the patient. Fall  Pertinent negatives include no fever and no numbness.         Past Medical History:   Diagnosis Date    Acute respiratory disease due to COVID-19 virus -2020    Anemia     Anxiety and depression     Asthma     Colon polyps     Constipation     Hypertension     Insomnia     Obstructive sleep apnea on CPAP     Postsurgical hypothyroidism     Primary hyperparathyroidism ([address]     Thromboembolus ([address]     during pregnancy, right breast    Type 2 diabetes mellitus ([address]        Past Surgical History:   Procedure Laterality Date    HX ANKLE FRACTURE TX Left 2010    HX  SECTION      HX HYSTERECTOMY  2006    ovaries intact    HX PARATHYROIDECTOMY  2007    HX PARATHYROIDECTOMY Left 2021    HX PARTIAL THYROIDECTOMY Right     [facility] BREAST SURG[facility] PROCEDURE UNLISTED Right     removal of \"clot\" from breast- right    [facility] CHEST SURG[facility] PROCEDURE UNLISTED      bronchoscopy & mediastinotomy         Family History:   Problem Relation Age of Onset    Thyroid Disease Mother         hypothyroidism    Cancer Father     Thyroid Disease Maternal Aunt         hypothyroidism    Thyroid Disease Maternal Uncle         hypothyroidism    Breast Cancer Neg Hx        Social History Socioeconomic History    Marital status: SINGLE     Spouse name: Not on file    Number of children: Not on file    Years of education: Not on file    Highest education level: Not on file   Occupational History    Not on file   Tobacco Use    Smoking status: Never Smoker    Smokeless tobacco: Never Used   Substance and Sexual Activity    Alcohol use: Yes     Alcohol/week: 0.0 standard drinks     Comment: occasional: once a month    Drug use: No    Sexual activity: Not on file   Other Topics Concern    Not on file   Social History Narrative    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, works in customer service-desk job     Social Determinants of Health     Financial Resource Strain:     Difficulty of Paying Living Expenses: Not on [address]:     Worried About [address] in the Last Year: Not on file    [address] in the Last Year: Not on file   Transportation Needs:     Lack of Transportation (Medical): Not on file    Lack of Transportation (Non-Medical):  Not on file   Physical Activity:     Days of Exercise per Week: Not on file    Minutes of Exercise per Session: Not on file   Stress:     Feeling of Stress : Not on file   Social Connections:     Frequency of Communication with Friends and Family: Not on file    Frequency of Social Gatherings with Friends and Family: Not on file    Attends Mu-ism Services: Not on file    Active Member of [address] [place] [address] or Organizations: Not on file    Attends Club or Organization Meetings: Not on file    Marital Status: Not on file   Intimate Partner Violence:     Fear of Current or Ex-Partner: Not on file    Emotionally Abused: Not on file    Physically Abused: Not on file    Sexually Abused: Not on file   Housing Stability:     Unable to Pay for Housing in the Last Year: Not on file    Number of [place] in the Last Year: Not on file    Unstable Housing in the Last Year: Not on file         ALLERGIES: Definity [perflutren lipid microspheres] and Other medication    Review of Systems   Constitutional: Negative for chills and fever. Musculoskeletal: Positive for arthralgias and joint swelling. Negative for gait problem. Skin: Negative for color change, pallor and wound. Neurological: Negative for weakness and numbness. Vitals:    12/15/21 2140   BP: (!) 194/114   Pulse: 74   Resp: 16   Temp: 98.1 °F (36.7 °C)   SpO2: 97%   Weight: 97.5 kg (214 lb 15.2 oz)   Height: 5' 5\" (1.651 m)            Physical Exam  Vitals and nursing note reviewed. Constitutional:       General: She is not in acute distress. Appearance: Normal appearance. She is well-developed. She is not ill-appearing, toxic-appearing or diaphoretic. HENT:      Head: Normocephalic and atraumatic. Eyes:      General: No scleral icterus. Conjunctiva/sclera: Conjunctivae normal.   Pulmonary:      Effort: Pulmonary effort is normal. No respiratory distress. Breath sounds: No stridor. Musculoskeletal:      Cervical back: No rigidity. Comments: Full range of motion of the lower extremities. There are some mild diffuse anterior knee tenderness bilaterally. Some soft tissue swelling in the anterior knees as well worse on the right side compared to the left. Full range of motion of the hips. Skin:     General: Skin is warm and dry. Capillary Refill: Capillary refill takes less than 2 seconds. Coloration: Skin is not jaundiced or pale. Findings: No bruising, erythema or lesion. Neurological:      General: No focal deficit present. Mental Status: She is alert and oriented to person, place, and time. Mental status is at baseline. Cranial Nerves: No cranial nerve deficit. Sensory: No sensory deficit. Psychiatric:         Mood and Affect: Mood normal.         Behavior: Behavior normal.         Thought Content:  Thought content normal.          MDM  Number of Diagnoses or Management Options  Bilateral knee effusions  Fall, initial encounter  Diagnosis management comments: X-ray show effusions but no acute trauma. Patient has ongoing knee issues has not been taking her NSAIDs regularly advised that she start this and do Ace wrap's to both knees until she can follow back up with orthopedics. Agusto Sagastume MD; 12/16/2021 @2:09 AM Voice dictation software was used during the making of this note. This software is not perfect and grammatical and other typographical errors may be present.   This note has not been proofread for errors.  ===================================================================          Amount and/or Complexity of Data Reviewed  Tests in the radiology section of CPT®: ordered and reviewed           Procedures

## 2022-03-18 PROBLEM — R13.10 DYSPHAGIA: Status: ACTIVE | Noted: 2019-03-27

## 2022-03-18 PROBLEM — I10 ESSENTIAL HYPERTENSION: Status: ACTIVE | Noted: 2017-06-20

## 2022-03-19 PROBLEM — E66.01 OBESITY, MORBID (HCC): Status: ACTIVE | Noted: 2018-08-14

## 2022-03-19 PROBLEM — Z86.010 HISTORY OF COLON POLYPS: Status: ACTIVE | Noted: 2017-10-10

## 2022-03-19 PROBLEM — Z86.0100 HISTORY OF COLON POLYPS: Status: ACTIVE | Noted: 2017-10-10

## 2022-03-19 PROBLEM — G47.33 OSA (OBSTRUCTIVE SLEEP APNEA): Status: ACTIVE | Noted: 2019-10-04

## 2022-03-19 PROBLEM — A04.8 HELICOBACTER PYLORI INFECTION: Status: ACTIVE | Noted: 2019-06-03

## 2022-04-15 ENCOUNTER — HOSPITAL ENCOUNTER (OUTPATIENT)
Dept: LAB | Age: 52
Discharge: HOME | End: 2022-04-15
Payer: COMMERCIAL

## 2022-04-15 DIAGNOSIS — I10 ESSENTIAL HYPERTENSION: ICD-10-CM

## 2022-04-15 DIAGNOSIS — E78.2 MIXED HYPERLIPIDEMIA: ICD-10-CM

## 2022-04-15 LAB
ALBUMIN SERPL-MCNC: 3.5 G/DL (ref 3.5–5)
ALBUMIN/GLOB SERPL: 0.9 {RATIO} (ref 1.2–3.5)
ALP SERPL-CCNC: 85 U/L (ref 50–136)
ALT SERPL-CCNC: 24 U/L (ref 12–65)
ANION GAP SERPL CALC-SCNC: 3 MMOL/L (ref 7–16)
AST SERPL-CCNC: 14 U/L (ref 15–37)
BILIRUB SERPL-MCNC: 0.6 MG/DL (ref 0.2–1.1)
BUN SERPL-MCNC: 12 MG/DL (ref 6–23)
CALCIUM SERPL-MCNC: 10.3 MG/DL (ref 8.3–10.4)
CHLORIDE SERPL-SCNC: 107 MMOL/L (ref 98–107)
CHOLEST SERPL-MCNC: 241 MG/DL
CO2 SERPL-SCNC: 29 MMOL/L (ref 21–32)
CREAT SERPL-MCNC: 0.7 MG/DL (ref 0.6–1)
GLOBULIN SER CALC-MCNC: 3.8 G/DL (ref 2.3–3.5)
GLUCOSE SERPL-MCNC: 94 MG/DL (ref 65–100)
HDLC SERPL-MCNC: 58 MG/DL (ref 40–60)
HDLC SERPL: 4.2 {RATIO}
LDLC SERPL CALC-MCNC: 162.4 MG/DL
POTASSIUM SERPL-SCNC: 4.1 MMOL/L (ref 3.5–5.1)
PROT SERPL-MCNC: 7.3 G/DL (ref 6.3–8.2)
SODIUM SERPL-SCNC: 139 MMOL/L (ref 136–145)
TRIGL SERPL-MCNC: 103 MG/DL (ref 35–150)
VLDLC SERPL CALC-MCNC: 20.6 MG/DL (ref 6–23)

## 2022-04-15 PROCEDURE — 80053 COMPREHEN METABOLIC PANEL: CPT

## 2022-04-15 PROCEDURE — 36415 COLL VENOUS BLD VENIPUNCTURE: CPT

## 2022-04-15 PROCEDURE — 80061 LIPID PANEL: CPT

## 2022-04-18 NOTE — PROGRESS NOTES
Please call the patient regarding their result. Cholesterol levels are elevated, try to take the rosuvastatin medicine to help get it down. We will need to recheck levels at next visit if not checked before then.

## 2022-05-24 ENCOUNTER — OFFICE VISIT (OUTPATIENT)
Dept: ENDOCRINOLOGY | Age: 52
End: 2022-05-24
Payer: MEDICAID

## 2022-05-24 VITALS
BODY MASS INDEX: 38.77 KG/M2 | OXYGEN SATURATION: 96 % | HEART RATE: 80 BPM | WEIGHT: 233 LBS | SYSTOLIC BLOOD PRESSURE: 142 MMHG | DIASTOLIC BLOOD PRESSURE: 102 MMHG

## 2022-05-24 DIAGNOSIS — E89.0 POSTSURGICAL HYPOTHYROIDISM: Primary | ICD-10-CM

## 2022-05-24 DIAGNOSIS — E21.0 PRIMARY HYPERPARATHYROIDISM (HCC): ICD-10-CM

## 2022-05-24 DIAGNOSIS — E55.9 VITAMIN D DEFICIENCY: ICD-10-CM

## 2022-05-24 PROCEDURE — 99214 OFFICE O/P EST MOD 30 MIN: CPT | Performed by: INTERNAL MEDICINE

## 2022-05-24 RX ORDER — ERGOCALCIFEROL (VITAMIN D2) 1250 MCG
50000 CAPSULE ORAL
Qty: 4 CAPSULE | Refills: 11 | Status: SHIPPED | OUTPATIENT
Start: 2022-05-24 | End: 2022-10-19 | Stop reason: SDUPTHER

## 2022-05-24 RX ORDER — LEVOTHYROXINE SODIUM 88 UG/1
88 TABLET ORAL
Qty: 30 TABLET | Refills: 11 | Status: SHIPPED | OUTPATIENT
Start: 2022-05-24 | End: 2022-10-19 | Stop reason: SDUPTHER

## 2022-05-24 NOTE — PROGRESS NOTES
Rina Godinez MD, [address]            Reason for visit: follow-up of hypothyroidism and hyperparathyroidism      ASSESSMENT AND PLAN:    1. Postsurgical hypothyroidism  She is biochemically euthyroid. Continue levothyroxine as currently prescribed. - levothyroxine (SYNTHROID) 88 MCG tablet; Take 1 tablet by mouth every morning (before breakfast)  Dispense: 30 tablet; Refill: 11  - T4, Free; Future  - TSH; Future    2. Primary hyperparathyroidism ([address]  Ms. Justice Jolly is now status post redo parathyroidectomy in January 2021 for recurrence of primary hyperparathyroidism. Intraoperative parathyroid hormone fell as expected, suggesting cure. Likewise, her first postoperative calcium was normal.  Somewhat surprisingly, calcium levels checked since then (including her most recent labs below) have been slightly elevated and phosphorus levels have been slightly low, both suggestive of a second recurrence of primary hyperparathyroidism. Parathyroid hormone is nonsuppressed. She clearly has persistent/recurrent primary hyperparathyroidism. I doubt that she is a candidate for a third parathyroid operation. Medical therapy with cinacalcet is indicated only for symptomatic hypercalcemia associated with primary hyperparathyroidism. Her indication for treatment was her young age, not symptoms. For now, I will monitor her calcium and parathyroid hormone over time. - Calcium, Ionized; Future  - Comprehensive Metabolic Panel; Future  - Vitamin D 25 Hydroxy; Future  - PTH, Intact; Future    3. Vitamin D deficiency  Vitamin D remains low but is improved from before. Continue weekly ergocalciferol.  - ergocalciferol (ERGOCALCIFEROL) 1.25 MG (32796 UT) capsule; Take 1 capsule by mouth every 7 days  Dispense: 4 capsule; Refill: 11  - Vitamin D 25 Hydroxy; Future        Follow-up and Dispositions    · Return in about 4 months (around 9/24/2022).          History of Present Illness:    HYPERCALCEMIA  Kai Velasco is here for follow-up of hypercalcemia. She was found to have primary hyperparathyroidism ~2007 and underwent parathyroidectomy (one gland per the patient) in 2007 (Dr. Thelma Lebron). Due to disease recurrence, she underwent left inferior parathyroidectomy and focused cervical exploration 1/8/2021 (Dr. Themla Lebron).      Related comorbidies/clinical features:   -Metabolic bone disease: DXA scan 8/12/2020 demonstrated normal bone density  -Fragility fractures: none (prior traumatic ankle fracture)  -Nephrolithiasis: none  -Renal dysfunction: none  -Thiazide diuretic use: HCTZ since 2002 to June 2020; resumed August 2020 (currently prescribed by Dr. Slava Peterson). HCTZ stopped ~August 2021.  -Calcium/vitamin D supplementation: none currently     Bone densitometry/DXA:  8/12/2020 ([address] [place] [address])- normal BMD     Symptoms: See review of systems below     Labs:  11/12/2014: Calcium 9.9.  12/14/2015: Calcium 9.0.  3/8/2016: Calcium 10.0.  4/7/2016: Calcium 9.9.  6/15/2016: Calcium 9.9.  6/30/2016: Calcium 9.8.  6/20/2017: Calcium 10.0.  8/16/2018: Calcium 10.3.  5/20/2019: Calcium 10.5.  1/7/2020: Calcium 10.5.  2/12/2020: Calcium 11.0.  2/13/2020: Ionized calcium 5.9 (reference 4.5-5.6), intact parathyroid hormone 84, 25-hydroxy vitamin D 5.8.  5/22/2020: Calcium 10.7, ionized calcium 5.8 (reference 4.5-5.6), intact parathyroid hormone 50, 25-hydroxy vitamin D 41.4.  6/1/2020: Calcium 11.6.  8/14/2020: Calcium 10.6, phosphorus 2.6, albumin 4.1, intact parathyroid hormone 41, 25-hydroxy vitamin D 30.8.  1/8/2021: Calcium 10.7, albumin 3.8, intraoperative parathyroid hormone 148 --> 35 (76% decrease).   1/27/2021: Calcium 9.9, albumin 3.7, 25-hydroxy vitamin D 25.6.  2/12/2021: Calcium 10.4, phosphorus 2.9, albumin 4.1, intact parathyroid hormone 37, 25-hydroxy vitamin D 41.1.  3/26/2021: 25 hydroxy vitamin D 93.3.  6/24/2021: Calcium 10.7, phosphorus 2.5, albumin 4.2, intact parathyroid hormone 40, 25-hydroxy vitamin D 36.2.  2021: Calcium 10.6, albumin 3.7, 25-hydroxy vitamin D 40.2.  2021: Calcium 10.9, albumin 3.9.  2022: Calcium 10.7, phosphorus 3.1, albumin 4.4, intact parathyroid hormone 88, 25-hydroxy vitamin D 15.1.  4/15/2022: Calcium 10.3, albumin 3.5.  2022: Calcium 10.6, phosphorus 2.8, albumin 4.1, intact parathyroid hormone 39, 25-hydroxy vitamin D 23. 2.     Imagin/3/2020: Head and neck ultrasound [facility])- 1.2 cm solid nodule adjacent to the left lower thyroid lobe, possibly a parathyroid adenoma.     2020: Technetium 99 sestamibi parathyroid scan [facility])- Findings consistent with parathyroid adenoma at the left lower thyroid pole.        THYROID DYSFUNCTION  Steven Ryan is seen for follow-up of hypothyroidism following right thyroid lobectomy (at the time of parathyroidectomy) in  (Dr. Aníbal Garcia); surgical pathology was benign.       Current symptoms: See review of systems below     Prior treatment: She has been on levothyroxine since diagnosis (88 mcg daily since ~).    Pertinent labs:  6/15/2016: TSH 3.66.  2017: TSH 3.390, free T4 1.14, T3 135.  2018: TSH 2.910.  2019: TSH 3.070.  2019: TSH 1.740.  2020: TSH 2.810.  2020: TSH 1.960, free T4 1.10.  2021: TSH 1.766.  2021: TSH 1.030, free T4 1.22.  3/26/2021: TSH 0.920.  2021: TSH 1.156.  2022: TSH 3.970, free T4 1. 12.  2022: TSH 0.866, free T4 1. 22.     Imagin/3/2020: Ultrasound [facility])- Right lobe and isthmus surgically absent, left lobe 4.5 x 1.3 x 2.7 cm. Homogeneous echotexture. No nodules. 1.2 x 0.9 cm hypoechoic nodule adjacent to the left lower pole, likely a parathyroid adenoma. Review of Systems   Constitutional: Positive for fatigue and unexpected weight change (gained 20 pounds in early ; intentionally lost 10 pounds in the last 6 weeks).    Musculoskeletal: Positive for arthralgias (knees). BP (!) 142/102 (Site: Left Upper Arm, Position: Sitting)   Pulse 80   Wt 233 lb (105.7 kg)   SpO2 96%   BMI 38.77 kg/m²   Wt Readings from Last 3 Encounters:   05/24/22 233 lb (105.7 kg)   04/13/22 242 lb 3.2 oz (109.9 kg)   01/07/22 224 lb (101.6 kg)       Physical Exam  HENT:      Head: Normocephalic. Neck:      Thyroid: No thyroid mass or thyromegaly. Comments: Healed parathyroidectomy scar. No palpable neck masses. Cardiovascular:      Rate and Rhythm: Normal rate. Pulmonary:      Effort: No respiratory distress. Breath sounds: Normal breath sounds. Neurological:      Mental Status: She is alert. Psychiatric:         Mood and Affect: Mood normal.         Behavior: Behavior normal.         Orders Placed This Encounter   Procedures    T4, Free     Standing Status:   Future     Standing Expiration Date:   5/24/2023    Calcium, Ionized     Standing Status:   Future     Standing Expiration Date:   5/24/2023    Comprehensive Metabolic Panel     Standing Status:   Future     Standing Expiration Date:   5/24/2023    Vitamin D 25 Hydroxy     Standing Status:   Future     Standing Expiration Date:   5/24/2023    PTH, Intact     Standing Status:   Future     Standing Expiration Date:   5/24/2023    TSH     Standing Status:   Future     Standing Expiration Date:   5/24/2023       Current Outpatient Medications   Medication Sig Dispense Refill    ergocalciferol (ERGOCALCIFEROL) 1.25 MG (10312 UT) capsule Take 1 capsule by mouth every 7 days 4 capsule 11    levothyroxine (SYNTHROID) 88 MCG tablet Take 1 tablet by mouth every morning (before breakfast) 30 tablet 11    Lancets MISC Accu-chek Guide fastclix lancet drums;  Check BS BID; Dx:E11.9      acetaminophen (TYLENOL) 500 MG tablet Take 500 mg by mouth every 6 hours as needed      albuterol sulfate  (90 Base) MCG/ACT inhaler Inhale 1 puff into the lungs every 4 hours as needed      ALPRAZolam (XANAX) 0.5 MG tablet Take 0.5 mg by mouth 2 times daily as needed.  buPROPion (WELLBUTRIN XL) 150 MG extended release tablet Take 150 mg by mouth      citalopram (CELEXA) 20 MG tablet Take 20 mg by mouth daily      cyclobenzaprine (FLEXERIL) 10 MG tablet Take 10 mg by mouth 3 times daily as needed      fluticasone (FLONASE) 50 MCG/ACT nasal spray 2 sprays by Nasal route daily      hydrALAZINE (APRESOLINE) 50 MG tablet Take 50 mg by mouth in the morning and at bedtime       hyoscyamine (ANASPAZ;LEVSIN) 125 MCG tablet Take 0.125 mg by mouth as needed      lubiprostone (AMITIZA) 8 MCG CAPS capsule Take 8 mcg by mouth as needed      metFORMIN (GLUCOPHAGE) 500 MG tablet Take 1,000 mg by mouth      nystatin (MYCOSTATIN) 115514 UNIT/GM cream Apply topically 2 times daily      rosuvastatin (CRESTOR) 5 MG tablet Take 5 mg by mouth      spironolactone (ALDACTONE) 25 MG tablet Take by mouth daily      traMADol (ULTRAM) 50 MG tablet Take 50 mg by mouth every 6 hours as needed. No current facility-administered medications for this visit. Amada Julio MD, FACE      Portions of this note were generated with the assistance of voice recognition software. As such, some errors in transcription may be present.

## 2022-09-17 ENCOUNTER — HOSPITAL ENCOUNTER (OUTPATIENT)
Dept: MAMMOGRAPHY | Age: 52
Discharge: HOME | End: 2022-09-20
Payer: MEDICAID

## 2022-09-17 DIAGNOSIS — Z12.31 VISIT FOR SCREENING MAMMOGRAM: ICD-10-CM

## 2022-09-17 PROCEDURE — 77067 SCR MAMMO BI INCL CAD: CPT

## 2022-10-18 DIAGNOSIS — E89.0 POSTSURGICAL HYPOTHYROIDISM: ICD-10-CM

## 2022-10-18 DIAGNOSIS — E21.0 PRIMARY HYPERPARATHYROIDISM (HCC): ICD-10-CM

## 2022-10-18 DIAGNOSIS — E55.9 VITAMIN D DEFICIENCY: ICD-10-CM

## 2022-10-19 ENCOUNTER — OFFICE VISIT (OUTPATIENT)
Dept: ENDOCRINOLOGY | Age: 52
End: 2022-10-19
Payer: MEDICAID

## 2022-10-19 VITALS
BODY MASS INDEX: 37.94 KG/M2 | WEIGHT: 228 LBS | SYSTOLIC BLOOD PRESSURE: 138 MMHG | HEART RATE: 79 BPM | DIASTOLIC BLOOD PRESSURE: 88 MMHG | OXYGEN SATURATION: 98 %

## 2022-10-19 DIAGNOSIS — E21.0 PRIMARY HYPERPARATHYROIDISM (HCC): ICD-10-CM

## 2022-10-19 DIAGNOSIS — E55.9 VITAMIN D DEFICIENCY: ICD-10-CM

## 2022-10-19 DIAGNOSIS — E89.0 POSTSURGICAL HYPOTHYROIDISM: Primary | ICD-10-CM

## 2022-10-19 LAB
25(OH)D3 SERPL-MCNC: 40.9 NG/ML (ref 30–100)
ALBUMIN SERPL-MCNC: 3.7 G/DL (ref 3.5–5)
ALBUMIN/GLOB SERPL: 1.2 {RATIO} (ref 0.4–1.6)
ALP SERPL-CCNC: 91 U/L (ref 50–136)
ALT SERPL-CCNC: 17 U/L (ref 12–65)
ANION GAP SERPL CALC-SCNC: 7 MMOL/L (ref 2–11)
AST SERPL-CCNC: 13 U/L (ref 15–37)
BILIRUB SERPL-MCNC: 0.5 MG/DL (ref 0.2–1.1)
BUN SERPL-MCNC: 6 MG/DL (ref 6–23)
CALCIUM SERPL-MCNC: 10.7 MG/DL (ref 8.3–10.4)
CHLORIDE SERPL-SCNC: 108 MMOL/L (ref 101–110)
CO2 SERPL-SCNC: 28 MMOL/L (ref 21–32)
CREAT SERPL-MCNC: 0.7 MG/DL (ref 0.6–1)
GLOBULIN SER CALC-MCNC: 3.2 G/DL (ref 2.8–4.5)
GLUCOSE SERPL-MCNC: 85 MG/DL (ref 65–100)
POTASSIUM SERPL-SCNC: 4.1 MMOL/L (ref 3.5–5.1)
PROT SERPL-MCNC: 6.9 G/DL (ref 6.3–8.2)
SODIUM SERPL-SCNC: 143 MMOL/L (ref 133–143)
T4 FREE SERPL-MCNC: 1.2 NG/DL (ref 0.78–1.46)
TSH, 3RD GENERATION: 0.74 UIU/ML (ref 0.36–3.74)

## 2022-10-19 PROCEDURE — 99214 OFFICE O/P EST MOD 30 MIN: CPT | Performed by: INTERNAL MEDICINE

## 2022-10-19 RX ORDER — LEVOTHYROXINE SODIUM 88 UG/1
88 TABLET ORAL
Qty: 30 TABLET | Refills: 11 | Status: SHIPPED | OUTPATIENT
Start: 2022-10-19

## 2022-10-19 RX ORDER — SEMAGLUTIDE 1.34 MG/ML
INJECTION, SOLUTION SUBCUTANEOUS
COMMUNITY
Start: 2022-08-27

## 2022-10-19 RX ORDER — ERGOCALCIFEROL (VITAMIN D2) 1250 MCG
50000 CAPSULE ORAL
Qty: 4 CAPSULE | Refills: 11 | Status: SHIPPED | OUTPATIENT
Start: 2022-10-19

## 2022-10-19 NOTE — PROGRESS NOTES
Pankaj Daigle MD, [facility]            Reason for visit: follow-up of hypothyroidism and hyperparathyroidism      ASSESSMENT AND PLAN:    1. Postsurgical hypothyroidism  She is biochemically euthyroid. Continue levothyroxine as currently prescribed. - levothyroxine (SYNTHROID) 88 MCG tablet; Take 1 tablet by mouth every morning (before breakfast)  Dispense: 30 tablet; Refill: 11      2. Primary hyperparathyroidism ([address]  Ms. Vickey Orosco is now status post redo parathyroidectomy in January 2021 for recurrence of primary hyperparathyroidism. Intraoperative parathyroid hormone fell as expected, suggesting cure. Likewise, her first postoperative calcium was normal.  Somewhat surprisingly, calcium levels checked since then (including her most recent labs below) have been slightly elevated and phosphorus levels have been slightly low, both suggestive of a second recurrence of primary hyperparathyroidism. Parathyroid hormone is nonsuppressed. She clearly has persistent/recurrent primary hyperparathyroidism. I doubt that she is a candidate for a third parathyroid operation. Medical therapy with cinacalcet is indicated only for symptomatic hypercalcemia associated with primary hyperparathyroidism. Her indication for treatment was her young age, not symptoms. For now, I will monitor her calcium and parathyroid hormone over time. 3. Vitamin D deficiency  Vitamin D remains low but is improved from before. Continue weekly ergocalciferol.  - ergocalciferol (ERGOCALCIFEROL) 1.25 MG (20152 UT) capsule; Take 1 capsule by mouth every 7 days  Dispense: 4 capsule; Refill: 11        Follow-up and Dispositions    Return in about 6 months (around 4/19/2023). History of Present Illness:    HYPERCALCEMIA  Shell Yoon is here for follow-up of hypercalcemia.   She was found to have primary hyperparathyroidism ~2007 and underwent parathyroidectomy (one gland per the patient) in 2007 (Dr. Bhavani Lin). Due to disease recurrence, she underwent left inferior parathyroidectomy and focused cervical exploration 1/8/2021 (Dr. Bhavani Lin). Related comorbidies/clinical features:   -Metabolic bone disease: DXA scan 8/12/2020 demonstrated normal bone density  -Fragility fractures: none (prior traumatic ankle fracture)  -Nephrolithiasis: none  -Renal dysfunction: none  -Thiazide diuretic use: HCTZ since 2002 to June 2020; resumed August 2020 (currently prescribed by Dr. Danita Wong). HCTZ stopped ~August 2021.  -Calcium/vitamin D supplementation: none currently     Bone densitometry/DXA:  8/12/2020 (Anitha Ellis)- normal BMD     Symptoms: See review of systems below     Labs:  11/12/2014: Calcium 9.9.  12/14/2015: Calcium 9.0.  3/8/2016: Calcium 10.0.  4/7/2016: Calcium 9.9.  6/15/2016: Calcium 9.9.  6/30/2016: Calcium 9.8.  6/20/2017: Calcium 10.0.  8/16/2018: Calcium 10.3.  5/20/2019: Calcium 10.5.  1/7/2020: Calcium 10.5.  2/12/2020: Calcium 11.0.  2/13/2020: Ionized calcium 5.9 (reference 4.5-5.6), intact parathyroid hormone 84, 25-hydroxy vitamin D 5.8.  5/22/2020: Calcium 10.7, ionized calcium 5.8 (reference 4.5-5.6), intact parathyroid hormone 50, 25-hydroxy vitamin D 41.4.  6/1/2020: Calcium 11.6.  8/14/2020: Calcium 10.6, phosphorus 2.6, albumin 4.1, intact parathyroid hormone 41, 25-hydroxy vitamin D 30.8.  1/8/2021: Calcium 10.7, albumin 3.8, intraoperative parathyroid hormone 148 --> 35 (76% decrease).   1/27/2021: Calcium 9.9, albumin 3.7, 25-hydroxy vitamin D 25.6.  2/12/2021: Calcium 10.4, phosphorus 2.9, albumin 4.1, intact parathyroid hormone 37, 25-hydroxy vitamin D 41.1.  3/26/2021: 25 hydroxy vitamin D 93.3.  6/24/2021: Calcium 10.7, phosphorus 2.5, albumin 4.2, intact parathyroid hormone 40, 25-hydroxy vitamin D 36.2.  9/2/2021: Calcium 10.6, albumin 3.7, 25-hydroxy vitamin D 40.2.  9/23/2021: Calcium 10.9, albumin 3.9.  1/4/2022: Calcium 10.7, phosphorus 3.1, albumin 4.4, intact parathyroid hormone 88, 25-hydroxy vitamin D 15.1.  4/15/2022: Calcium 10.3, albumin 3.5.  2022: Calcium 10.6, phosphorus 2.8, albumin 4.1, intact parathyroid hormone 39, 25-hydroxy vitamin D 23.2.  2022: Calcium 10.8, albumin 4.2.  10/18/2022: Calcium 10.7, ionized calcium pending, albumin 3.7, intact parathyroid hormone pending, 25-hydroxy vitamin D 40.9. Imagin/3/2020: Head and neck ultrasound [employer] [facility])- 1.2 cm solid nodule adjacent to the left lower thyroid lobe, possibly a parathyroid adenoma. 2020: Technetium 99 sestamibi parathyroid scan [facility])- Findings consistent with parathyroid adenoma at the left lower thyroid pole. THYROID DYSFUNCTION  Chadd Do is seen for follow-up of hypothyroidism following right thyroid lobectomy (at the time of parathyroidectomy) in  (Dr. Natasha Robins); surgical pathology was benign. Current symptoms: See review of systems below     Prior treatment: She has been on levothyroxine since diagnosis (88 mcg daily since ~). Pertinent labs:  6/15/2016: TSH 3.66.  2017: TSH 3.390, free T4 1.14, T3 135.  2018: TSH 2.910.  2019: TSH 3.070.  2019: TSH 1.740.  2020: TSH 2.810.  2020: TSH 1.960, free T4 1.10.  2021: TSH 1.766.  2021: TSH 1.030, free T4 1.22.  3/26/2021: TSH 0.920.  2021: TSH 1.156.  2022: TSH 3.970, free T4 1. 12.  2022: TSH 0.866, free T4 1.22.  10/18/2022: TSH 0.741, free T4 1.2. Imagin/3/2020: Ultrasound [facility])- Right lobe and isthmus surgically absent, left lobe 4.5 x 1.3 x 2.7 cm. Homogeneous echotexture. No nodules. 1.2 x 0.9 cm hypoechoic nodule adjacent to the left lower pole, likely a parathyroid adenoma. Review of Systems   Constitutional:  Positive for fatigue and unexpected weight change (gained 20 pounds in early ; intentionally lost 14 pounds in the last 6 months).    Musculoskeletal: Positive for arthralgias (knees). /88   Pulse 79   Wt 228 lb (103.4 kg)   SpO2 98%   BMI 37.94 kg/m²   Wt Readings from Last 3 Encounters:   10/19/22 228 lb (103.4 kg)   05/24/22 233 lb (105.7 kg)   04/13/22 242 lb 3.2 oz (109.9 kg)       Physical Exam  HENT:      Head: Normocephalic. Neck:      Thyroid: No thyroid mass or thyromegaly. Comments: Healed parathyroidectomy scar. No palpable neck masses. Cardiovascular:      Rate and Rhythm: Normal rate. Pulmonary:      Effort: No respiratory distress. Breath sounds: Normal breath sounds. Neurological:      Mental Status: She is alert. Psychiatric:         Mood and Affect: Mood normal.         Behavior: Behavior normal.       Orders Placed This Encounter   Procedures    Comprehensive Metabolic Panel     Standing Status:   Future     Standing Expiration Date:   10/19/2023    Vitamin D 25 Hydroxy     Standing Status:   Future     Standing Expiration Date:   10/19/2023    Phosphorus     Standing Status:   Future     Standing Expiration Date:   10/19/2023    TSH     Standing Status:   Future     Standing Expiration Date:   10/19/2023    T4, Free     Standing Status:   Future     Standing Expiration Date:   10/19/2023         Current Outpatient Medications   Medication Sig Dispense Refill    OZEMPIC, 0.25 OR 0.5 MG/DOSE, 2 MG/1.5ML SOPN INJECT 0.5MG UNDER THE SKIN EVERY WEEK      levothyroxine (SYNTHROID) 88 MCG tablet Take 1 tablet by mouth every morning (before breakfast) 30 tablet 11    ergocalciferol (ERGOCALCIFEROL) 1.25 MG (80954 UT) capsule Take 1 capsule by mouth every 7 days 4 capsule 11    Lancets MISC Accu-chek Guide fastclix lancet drums;  Check BS BID; Dx:E11.9      acetaminophen (TYLENOL) 500 MG tablet Take 500 mg by mouth every 6 hours as needed      albuterol sulfate  (90 Base) MCG/ACT inhaler Inhale 1 puff into the lungs every 4 hours as needed      ALPRAZolam (XANAX) 0.5 MG tablet Take 0.5 mg by mouth 2 times daily as needed. buPROPion (WELLBUTRIN XL) 150 MG extended release tablet Take 150 mg by mouth      citalopram (CELEXA) 20 MG tablet Take 20 mg by mouth daily      cyclobenzaprine (FLEXERIL) 10 MG tablet Take 10 mg by mouth 3 times daily as needed      fluticasone (FLONASE) 50 MCG/ACT nasal spray 2 sprays by Nasal route daily      hydrALAZINE (APRESOLINE) 50 MG tablet Take 50 mg by mouth in the morning and at bedtime       lubiprostone (AMITIZA) 8 MCG CAPS capsule Take 8 mcg by mouth as needed      nystatin (MYCOSTATIN) 090622 UNIT/GM cream Apply topically 2 times daily      spironolactone (ALDACTONE) 25 MG tablet Take by mouth daily      traMADol (ULTRAM) 50 MG tablet Take 50 mg by mouth every 6 hours as needed. hyoscyamine (ANASPAZ;LEVSIN) 125 MCG tablet Take 0.125 mg by mouth as needed (Patient not taking: Reported on 10/19/2022)      metFORMIN (GLUCOPHAGE) 500 MG tablet Take 1,000 mg by mouth (Patient not taking: Reported on 10/19/2022)      rosuvastatin (CRESTOR) 5 MG tablet Take 5 mg by mouth (Patient not taking: Reported on 10/19/2022)       No current facility-administered medications for this visit. Beny Rivera MD, FACE      Portions of this note were generated with the assistance of voice recognition software. As such, some errors in transcription may be present.

## 2022-10-20 LAB
CA-I SERPL ISE-MCNC: 5.8 MG/DL (ref 4.5–5.6)
CALCIUM SERPL-MCNC: 10.4 MG/DL (ref 8.3–10.4)
PTH-INTACT SERPL-MCNC: 112.2 PG/ML (ref 18.5–88)

## 2023-05-10 DIAGNOSIS — E21.0 PRIMARY HYPERPARATHYROIDISM (HCC): ICD-10-CM

## 2023-05-10 DIAGNOSIS — E55.9 VITAMIN D DEFICIENCY: ICD-10-CM

## 2023-05-10 DIAGNOSIS — E89.0 POSTSURGICAL HYPOTHYROIDISM: ICD-10-CM

## 2023-05-11 LAB
25(OH)D3 SERPL-MCNC: 27.6 NG/ML (ref 30–100)
ALBUMIN SERPL-MCNC: 3.7 G/DL (ref 3.5–5)
ALBUMIN/GLOB SERPL: 1.1 (ref 0.4–1.6)
ALP SERPL-CCNC: 99 U/L (ref 50–136)
ALT SERPL-CCNC: 14 U/L (ref 12–65)
ANION GAP SERPL CALC-SCNC: 8 MMOL/L (ref 2–11)
AST SERPL-CCNC: 11 U/L (ref 15–37)
BILIRUB SERPL-MCNC: 0.9 MG/DL (ref 0.2–1.1)
BUN SERPL-MCNC: 11 MG/DL (ref 6–23)
CALCIUM SERPL-MCNC: 10.6 MG/DL (ref 8.3–10.4)
CHLORIDE SERPL-SCNC: 107 MMOL/L (ref 101–110)
CO2 SERPL-SCNC: 23 MMOL/L (ref 21–32)
CREAT SERPL-MCNC: 0.7 MG/DL (ref 0.6–1)
GLOBULIN SER CALC-MCNC: 3.5 G/DL (ref 2.8–4.5)
GLUCOSE SERPL-MCNC: 77 MG/DL (ref 65–100)
PHOSPHATE SERPL-MCNC: 2.3 MG/DL (ref 2.5–4.5)
POTASSIUM SERPL-SCNC: 3.9 MMOL/L (ref 3.5–5.1)
PROT SERPL-MCNC: 7.2 G/DL (ref 6.3–8.2)
SODIUM SERPL-SCNC: 138 MMOL/L (ref 133–143)
T4 FREE SERPL-MCNC: 1.2 NG/DL (ref 0.78–1.46)
TSH, 3RD GENERATION: 1.16 UIU/ML (ref 0.36–3.74)

## 2023-05-17 ENCOUNTER — OFFICE VISIT (OUTPATIENT)
Dept: ENDOCRINOLOGY | Age: 53
End: 2023-05-17
Payer: COMMERCIAL

## 2023-05-17 DIAGNOSIS — E21.0 PRIMARY HYPERPARATHYROIDISM (HCC): ICD-10-CM

## 2023-05-17 DIAGNOSIS — E55.9 VITAMIN D DEFICIENCY: ICD-10-CM

## 2023-05-17 DIAGNOSIS — E89.0 POSTSURGICAL HYPOTHYROIDISM: Primary | ICD-10-CM

## 2023-05-17 PROCEDURE — 3079F DIAST BP 80-89 MM HG: CPT | Performed by: INTERNAL MEDICINE

## 2023-05-17 PROCEDURE — 99214 OFFICE O/P EST MOD 30 MIN: CPT | Performed by: INTERNAL MEDICINE

## 2023-05-17 PROCEDURE — 3074F SYST BP LT 130 MM HG: CPT | Performed by: INTERNAL MEDICINE

## 2023-05-17 RX ORDER — LEVOTHYROXINE SODIUM 88 UG/1
88 TABLET ORAL
Qty: 30 TABLET | Refills: 11 | Status: SHIPPED | OUTPATIENT
Start: 2023-05-17

## 2023-05-17 RX ORDER — AMLODIPINE BESYLATE 10 MG/1
10 TABLET ORAL DAILY
COMMUNITY
Start: 2023-04-08

## 2023-05-17 RX ORDER — ERGOCALCIFEROL 1.25 MG/1
50000 CAPSULE ORAL
Qty: 4 CAPSULE | Refills: 11 | Status: SHIPPED | OUTPATIENT
Start: 2023-05-17

## 2023-05-17 ASSESSMENT — ENCOUNTER SYMPTOMS
TROUBLE SWALLOWING: 1
CONSTIPATION: 1
DIARRHEA: 0

## 2023-05-17 NOTE — PROGRESS NOTES
Terese Felder MD, [address]            Reason for visit: follow-up of hypothyroidism and hyperparathyroidism      ASSESSMENT AND PLAN:    1. Postsurgical hypothyroidism  Flora Meng is biochemically euthyroid. Continue thyroid hormone replacement as prescribed. Residual symptoms are attributable to something other than thyroid dysfunction. I note that she has sleep apnea but does not treat it. That likely contributes to her disordered sleep and resultant fatigue. Repeat thyroid function tests prior to the next appointment. - levothyroxine (SYNTHROID) 88 MCG tablet; Take 1 tablet by mouth every morning (before breakfast)  Dispense: 30 tablet; Refill: 11    2. Primary hyperparathyroidism ([facility] [address]  Ms. Good Craig is now status post redo parathyroidectomy in January 2021 for recurrence of primary hyperparathyroidism. Intraoperative parathyroid hormone fell as expected, suggesting cure. Likewise, her first postoperative calcium was normal.  Somewhat surprisingly, calcium levels checked since then (including her most recent labs below) have been slightly elevated and phosphorus levels have been slightly low, both suggestive of a second recurrence of primary hyperparathyroidism. Parathyroid hormone is nonsuppressed. She clearly has persistent/recurrent primary hyperparathyroidism. I doubt that she is a candidate for a third parathyroid operation. Medical therapy with cinacalcet is indicated only for symptomatic hypercalcemia associated with primary hyperparathyroidism. Her indication for treatment was her young age, not symptoms. For now, I will monitor her calcium and parathyroid hormone over time. 3. Vitamin D deficiency  Vitamin D remains low, likely because she does not take her vitamin D supplement as prescribed. Continue weekly ergocalciferol.  - ergocalciferol (ERGOCALCIFEROL) 1.25 MG (82997 UT) capsule;  Take 1 capsule by mouth every

## 2023-05-19 VITALS
OXYGEN SATURATION: 96 % | SYSTOLIC BLOOD PRESSURE: 122 MMHG | WEIGHT: 216 LBS | HEART RATE: 79 BPM | BODY MASS INDEX: 35.94 KG/M2 | DIASTOLIC BLOOD PRESSURE: 86 MMHG

## 2023-08-08 ENCOUNTER — OFFICE VISIT (OUTPATIENT)
Age: 53
End: 2023-08-08
Payer: COMMERCIAL

## 2023-08-08 VITALS
BODY MASS INDEX: 34.99 KG/M2 | HEART RATE: 78 BPM | HEIGHT: 65 IN | DIASTOLIC BLOOD PRESSURE: 80 MMHG | SYSTOLIC BLOOD PRESSURE: 122 MMHG | WEIGHT: 210 LBS

## 2023-08-08 DIAGNOSIS — E78.2 MIXED HYPERLIPIDEMIA: ICD-10-CM

## 2023-08-08 DIAGNOSIS — E11.9 TYPE 2 DIABETES MELLITUS WITHOUT COMPLICATION, UNSPECIFIED WHETHER LONG TERM INSULIN USE (HCC): ICD-10-CM

## 2023-08-08 DIAGNOSIS — I10 ESSENTIAL (PRIMARY) HYPERTENSION: Primary | ICD-10-CM

## 2023-08-08 PROCEDURE — 3079F DIAST BP 80-89 MM HG: CPT | Performed by: INTERNAL MEDICINE

## 2023-08-08 PROCEDURE — 99214 OFFICE O/P EST MOD 30 MIN: CPT | Performed by: INTERNAL MEDICINE

## 2023-08-08 PROCEDURE — 3074F SYST BP LT 130 MM HG: CPT | Performed by: INTERNAL MEDICINE

## 2023-08-08 PROCEDURE — 93000 ELECTROCARDIOGRAM COMPLETE: CPT | Performed by: INTERNAL MEDICINE

## 2023-08-08 ASSESSMENT — ENCOUNTER SYMPTOMS
SHORTNESS OF BREATH: 0
GASTROINTESTINAL NEGATIVE: 1
COUGH: 0

## 2023-08-08 NOTE — PROGRESS NOTES
[address]      Patient:  Loy Nicole  1970         SUBJECTIVE:  Loy Nicole is a  47 y.o. female seen for a follow up visit regarding the following:     Chief Complaint   Patient presents with    Cardiac Clearance    Hypertension     CC: HTN, HLD    HPI:   47 y.o. female with a history of HTN, HLD, DM type II who is here for evaluation prior to planned operation. Patient was last seen in office on 4/13/22 , since then reports that she is going to have knee replacement surgery in the near future. Planned for September 2023. Reports that she has had some left sided pain in the chest , lasts about 2 seconds. Comes and goes without obvious triggers. Worse at times at night. Not exertional.  No dyspnea with exertion, leg swelling, orthopnea. Knee pain in right. Able to go up flight of stairs without chest pain/angina or dyspnea. Cardiovascular Testing:  - Echo 2/4/20: LVEF >60%, RV normal size and function, trivial AI  - SPECT 3/3/20: exercise 6 min, no EKG changes, normal perfusion, LVEF 66%    Past medical history, past surgical history, family history, social history, and medications were all reviewed with the patient today and updated as necessary. Patient Active Problem List    Diagnosis Date Noted    Vitamin D deficiency 05/24/2022     Priority: Medium    Primary hyperparathyroidism ([address])     Postsurgical hypothyroidism     ANTONETTE (obstructive sleep apnea) 10/04/2019    BMI 45.0-49.9, adult ([address]) 06/03/2019     Last Assessment & Plan:   BMI counseling: Due to this patient's BMI, I have provided counseling   regarding nutrition. and Due to this patient's BMI, I have provided   counseling regarding physical activity. Helicobacter pylori infection 06/03/2019     Last Assessment & Plan:   Encouraged her to complete treatment. Will conduct breath test in 6 weeks   to confirm eradication.   Educated

## 2023-08-09 RX ORDER — EZETIMIBE 10 MG/1
10 TABLET ORAL DAILY
Qty: 30 TABLET | Refills: 11 | Status: SHIPPED | OUTPATIENT
Start: 2023-08-09

## 2023-08-09 NOTE — TELEPHONE ENCOUNTER
Spoke with patient and let her know we were having medication sent in. Voiced understanding.     Requested Prescriptions     Pending Prescriptions Disp Refills    ezetimibe (ZETIA) 10 MG tablet 30 tablet 11     Sig: Take 1 tablet by mouth daily

## 2023-08-09 NOTE — TELEPHONE ENCOUNTER
Please let the patient know I apologize for the prescription error. Yes, please give her prescription for Zetia 10 mg PO daily. Thank you.

## 2023-08-09 NOTE — TELEPHONE ENCOUNTER
Patient called stating she had an OV yesterday (8/9) and discussed her intolerance to statins. Patient states she went to the pharmacy to  her new alternative Rx but pharmacy states it had not been sent in. Patient could not remember the new Rx name. Please call and advise.

## 2023-09-09 ENCOUNTER — HOSPITAL ENCOUNTER (EMERGENCY)
Age: 53
Discharge: HOME | End: 2023-09-09
Attending: STUDENT IN AN ORGANIZED HEALTH CARE EDUCATION/TRAINING PROGRAM
Payer: MEDICAID

## 2023-09-09 VITALS
HEART RATE: 98 BPM | WEIGHT: 210 LBS | OXYGEN SATURATION: 98 % | DIASTOLIC BLOOD PRESSURE: 103 MMHG | SYSTOLIC BLOOD PRESSURE: 146 MMHG | HEIGHT: 65 IN | RESPIRATION RATE: 20 BRPM | BODY MASS INDEX: 34.99 KG/M2

## 2023-09-09 DIAGNOSIS — S01.511A LIP LACERATION, INITIAL ENCOUNTER: Primary | ICD-10-CM

## 2023-09-09 PROCEDURE — 90471 IMMUNIZATION ADMIN: CPT | Performed by: STUDENT IN AN ORGANIZED HEALTH CARE EDUCATION/TRAINING PROGRAM

## 2023-09-09 PROCEDURE — 6360000002 HC RX W HCPCS: Performed by: STUDENT IN AN ORGANIZED HEALTH CARE EDUCATION/TRAINING PROGRAM

## 2023-09-09 PROCEDURE — 90714 TD VACC NO PRESV 7 YRS+ IM: CPT | Performed by: STUDENT IN AN ORGANIZED HEALTH CARE EDUCATION/TRAINING PROGRAM

## 2023-09-09 PROCEDURE — 12011 RPR F/E/E/N/L/M 2.5 CM/<: CPT

## 2023-09-09 PROCEDURE — 99284 EMERGENCY DEPT VISIT MOD MDM: CPT

## 2023-09-09 RX ADMIN — CLOSTRIDIUM TETANI TOXOID ANTIGEN (FORMALDEHYDE INACTIVATED) AND CORYNEBACTERIUM DIPHTHERIAE TOXOID ANTIGEN (FORMALDEHYDE INACTIVATED) 0.5 ML: 5; 2 INJECTION, SUSPENSION INTRAMUSCULAR at 05:15

## 2023-09-09 ASSESSMENT — PAIN SCALES - GENERAL: PAINLEVEL_OUTOF10: 7

## 2023-09-09 ASSESSMENT — PAIN - FUNCTIONAL ASSESSMENT: PAIN_FUNCTIONAL_ASSESSMENT: 0-10

## 2023-09-09 ASSESSMENT — PAIN DESCRIPTION - DESCRIPTORS: DESCRIPTORS: ACHING

## 2023-09-09 ASSESSMENT — LIFESTYLE VARIABLES
HOW MANY STANDARD DRINKS CONTAINING ALCOHOL DO YOU HAVE ON A TYPICAL DAY: PATIENT DOES NOT DRINK
HOW OFTEN DO YOU HAVE A DRINK CONTAINING ALCOHOL: NEVER

## 2023-09-09 ASSESSMENT — PAIN DESCRIPTION - ORIENTATION: ORIENTATION: LEFT

## 2023-09-09 ASSESSMENT — PAIN DESCRIPTION - LOCATION: LOCATION: OTHER (COMMENT)
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
## 2023-09-09 NOTE — DISCHARGE INSTRUCTIONS
The stitches are absorbable over time. Try to avoid contact with the area with food or other liquids. You may use straws or solution such as protein shakes to see nourishment next 2 days. Try to rinse out the area with saline and warm water to keep the area clean. Return to the ER if any new or worsening symptoms. You may follow-up with the dentist next 2 days to recheck the teeth. Dental Services     The Emergency Department is not able to provide dental services - tooth extractions, root canal. Though we can provide antibiotics for abccess or infection. Listed below are free or low-cost options. THE [address]   [phone]  Tuesday and Thursday- registration starts at 10am. After registering you are given a time to return  Provides free x-rays, extractions, treatment of infection, and dental hygeine. Cannot have medicare, medicaid or other medical insurance coverage  Bring proof of [facility] residency (power bill, for example), Social Security Number and household income documents (including food stamps) as well as any current medications. (**if you do not live in [facility], contact the free clinic in your home county for the availability of dental services)    [facility]  [facility], [address] [place] [address] [phone]  Monday and Wednesday 8a-5p  Tuesday and Thursday    8a-7p Friday                               12-5p  Provides Adult and Childrens services. X-rays, extractions, treatment of infection, restorative care  Accepts medicaid, private insurance, self-pay.  Fees are on a sliding scale based on income (need to bring documentation)    [facility] [address] [facility] [address]     [phone]  Monday - Friday 8am-5p  Accepts medicaid for dental (but not dentures under 21)  Provides xrays, extractions, partials and dentures    [facility],

## 2023-11-13 ENCOUNTER — HOSPITAL ENCOUNTER (EMERGENCY)
Age: 53
Discharge: HOME | End: 2023-11-13

## 2023-11-13 ENCOUNTER — APPOINTMENT (OUTPATIENT)
Dept: GENERAL RADIOLOGY | Age: 53
End: 2023-11-13

## 2023-11-13 VITALS
HEART RATE: 86 BPM | RESPIRATION RATE: 16 BRPM | TEMPERATURE: 98.2 F | OXYGEN SATURATION: 99 % | SYSTOLIC BLOOD PRESSURE: 131 MMHG | DIASTOLIC BLOOD PRESSURE: 89 MMHG

## 2023-11-13 DIAGNOSIS — M25.461 KNEE EFFUSION, RIGHT: Primary | ICD-10-CM

## 2023-11-13 DIAGNOSIS — J06.9 ACUTE UPPER RESPIRATORY INFECTION: ICD-10-CM

## 2023-11-13 DIAGNOSIS — M25.561 ACUTE PAIN OF RIGHT KNEE: ICD-10-CM

## 2023-11-13 LAB
ANION GAP SERPL CALC-SCNC: 3 MMOL/L (ref 2–11)
BASOPHILS # BLD: 0 K/UL (ref 0–0.2)
BASOPHILS NFR BLD: 1 % (ref 0–2)
BUN SERPL-MCNC: 11 MG/DL (ref 6–23)
CALCIUM SERPL-MCNC: 10.6 MG/DL (ref 8.3–10.4)
CHLORIDE SERPL-SCNC: 107 MMOL/L (ref 101–110)
CO2 SERPL-SCNC: 30 MMOL/L (ref 21–32)
CREAT SERPL-MCNC: 0.86 MG/DL (ref 0.6–1)
DIFFERENTIAL METHOD BLD: NORMAL
EKG ATRIAL RATE: 82 BPM
EKG DIAGNOSIS: NORMAL
EKG P AXIS: 55 DEGREES
EKG P-R INTERVAL: 186 MS
EKG Q-T INTERVAL: 370 MS
EKG QRS DURATION: 88 MS
EKG QTC CALCULATION (BAZETT): 432 MS
EKG R AXIS: 39 DEGREES
EKG T AXIS: 24 DEGREES
EKG VENTRICULAR RATE: 82 BPM
EOSINOPHIL # BLD: 0.2 K/UL (ref 0–0.8)
EOSINOPHIL NFR BLD: 3 % (ref 0.5–7.8)
ERYTHROCYTE [DISTWIDTH] IN BLOOD BY AUTOMATED COUNT: 13.7 % (ref 11.9–14.6)
FLUAV RNA SPEC QL NAA+PROBE: NOT DETECTED
FLUBV RNA SPEC QL NAA+PROBE: NOT DETECTED
GLUCOSE SERPL-MCNC: 68 MG/DL (ref 65–100)
HCT VFR BLD AUTO: 39.7 % (ref 35.8–46.3)
HGB BLD-MCNC: 13 G/DL (ref 11.7–15.4)
IMM GRANULOCYTES # BLD AUTO: 0 K/UL (ref 0–0.5)
IMM GRANULOCYTES NFR BLD AUTO: 0 % (ref 0–5)
LYMPHOCYTES # BLD: 2.4 K/UL (ref 0.5–4.6)
LYMPHOCYTES NFR BLD: 37 % (ref 13–44)
MCH RBC QN AUTO: 29.4 PG (ref 26.1–32.9)
MCHC RBC AUTO-ENTMCNC: 32.7 G/DL (ref 31.4–35)
MCV RBC AUTO: 89.8 FL (ref 82–102)
MONOCYTES # BLD: 0.5 K/UL (ref 0.1–1.3)
MONOCYTES NFR BLD: 8 % (ref 4–12)
NEUTS SEG # BLD: 3.3 K/UL (ref 1.7–8.2)
NEUTS SEG NFR BLD: 51 % (ref 43–78)
NRBC # BLD: 0 K/UL (ref 0–0.2)
PLATELET # BLD AUTO: 414 K/UL (ref 150–450)
PMV BLD AUTO: 9.4 FL (ref 9.4–12.3)
POTASSIUM SERPL-SCNC: 4 MMOL/L (ref 3.5–5.1)
RBC # BLD AUTO: 4.42 M/UL (ref 4.05–5.2)
SARS-COV-2 RDRP RESP QL NAA+PROBE: NOT DETECTED
SODIUM SERPL-SCNC: 140 MMOL/L (ref 133–143)
SOURCE: NORMAL
TROPONIN I SERPL HS-MCNC: 4.5 PG/ML (ref 0–14)
WBC # BLD AUTO: 6.5 K/UL (ref 4.3–11.1)

## 2023-11-13 PROCEDURE — 84484 ASSAY OF TROPONIN QUANT: CPT

## 2023-11-13 PROCEDURE — 96372 THER/PROPH/DIAG INJ SC/IM: CPT

## 2023-11-13 PROCEDURE — 6360000002 HC RX W HCPCS: Performed by: STUDENT IN AN ORGANIZED HEALTH CARE EDUCATION/TRAINING PROGRAM

## 2023-11-13 PROCEDURE — 80048 BASIC METABOLIC PNL TOTAL CA: CPT

## 2023-11-13 PROCEDURE — 93010 ELECTROCARDIOGRAM REPORT: CPT | Performed by: INTERNAL MEDICINE

## 2023-11-13 PROCEDURE — 85025 COMPLETE CBC W/AUTO DIFF WBC: CPT

## 2023-11-13 PROCEDURE — 87502 INFLUENZA DNA AMP PROBE: CPT

## 2023-11-13 PROCEDURE — 71046 X-RAY EXAM CHEST 2 VIEWS: CPT

## 2023-11-13 PROCEDURE — 87635 SARS-COV-2 COVID-19 AMP PRB: CPT

## 2023-11-13 PROCEDURE — 93005 ELECTROCARDIOGRAM TRACING: CPT | Performed by: STUDENT IN AN ORGANIZED HEALTH CARE EDUCATION/TRAINING PROGRAM

## 2023-11-13 PROCEDURE — 99285 EMERGENCY DEPT VISIT HI MDM: CPT

## 2023-11-13 PROCEDURE — 73562 X-RAY EXAM OF KNEE 3: CPT

## 2023-11-13 RX ORDER — DEXAMETHASONE SODIUM PHOSPHATE 10 MG/ML
10 INJECTION INTRAMUSCULAR; INTRAVENOUS ONCE
Status: DISCONTINUED | OUTPATIENT
Start: 2023-11-13 | End: 2023-11-13

## 2023-11-13 RX ORDER — PREDNISONE 50 MG/1
50 TABLET ORAL DAILY
Qty: 5 TABLET | Refills: 0 | Status: SHIPPED | OUTPATIENT
Start: 2023-11-13 | End: 2023-11-18

## 2023-11-13 RX ORDER — DEXAMETHASONE SODIUM PHOSPHATE 10 MG/ML
10 INJECTION INTRAMUSCULAR; INTRAVENOUS ONCE
Status: COMPLETED | OUTPATIENT
Start: 2023-11-13 | End: 2023-11-13

## 2023-11-13 RX ORDER — KETOROLAC TROMETHAMINE 15 MG/ML
15 INJECTION, SOLUTION INTRAMUSCULAR; INTRAVENOUS ONCE
Status: COMPLETED | OUTPATIENT
Start: 2023-11-13 | End: 2023-11-13

## 2023-11-13 RX ORDER — KETOROLAC TROMETHAMINE 15 MG/ML
15 INJECTION, SOLUTION INTRAMUSCULAR; INTRAVENOUS ONCE
Status: DISCONTINUED | OUTPATIENT
Start: 2023-11-13 | End: 2023-11-13

## 2023-11-13 RX ORDER — DOXYCYCLINE HYCLATE 100 MG
100 TABLET ORAL 2 TIMES DAILY
Qty: 14 TABLET | Refills: 0 | Status: SHIPPED | OUTPATIENT
Start: 2023-11-13 | End: 2023-11-20

## 2023-11-13 RX ADMIN — KETOROLAC TROMETHAMINE 15 MG: 15 INJECTION, SOLUTION INTRAMUSCULAR; INTRAVENOUS at 21:06

## 2023-11-13 RX ADMIN — DEXAMETHASONE SODIUM PHOSPHATE 10 MG: 10 INJECTION INTRAMUSCULAR; INTRAVENOUS at 21:06

## 2023-11-13 ASSESSMENT — ENCOUNTER SYMPTOMS
TROUBLE SWALLOWING: 0
SHORTNESS OF BREATH: 0
ABDOMINAL PAIN: 0
COUGH: 1
VOMITING: 0
FACIAL SWELLING: 0
PHOTOPHOBIA: 0

## 2023-11-13 ASSESSMENT — PAIN DESCRIPTION - LOCATION: LOCATION: KNEE

## 2023-11-13 ASSESSMENT — PAIN SCALES - GENERAL: PAINLEVEL_OUTOF10: 6

## 2023-11-13 ASSESSMENT — PAIN - FUNCTIONAL ASSESSMENT: PAIN_FUNCTIONAL_ASSESSMENT: 0-10

## 2023-11-14 NOTE — DISCHARGE INSTRUCTIONS
Your COVID and flu test were both negative today. Please take the antibiotics for the full course to treat any bacterial infection. Use steroids for the full 5 days to relieve your symptoms. Please be sure to call your orthopedic doctor and go to your follow-up appointment this week. I recommend alternating tylenol and advil every 4 hours for pain, fever, bodyaches. Use cough drops, warm salt water gargles for sore throat. Use flonase nasal spray for congestion. Drink plenty of fluids and do not allow yourself to get dehydrated. Continue to monitor your symptoms and return for any new or worsening symptoms. Otherwise please follow up with your primary care provider. As we discussed, I did not find a life threatening cause of your symptoms today. However, THAT DOES NOT MEAN IT COULD NOT DEVELOP. If you develop ANY new or worsening symptoms, it is critical that you return for re-evaluation. This includes any symptoms that are concerning to you, especially symptoms such as inability to bear weight, fevers, chest pain, trouble breathing. If you do not return for re-evaluation, you risk serious complications, including death.

## 2023-11-14 NOTE — ED PROVIDER NOTES
Specified   Result Value Ref Range    Influenza A, WAYLON Not detected NOTD      Influenza B, WAYLON Not detected NOTD     XR CHEST (2 VW)    Narrative    XR CHEST (2 VW)  11/13/2023 6:13 PM    History: Pain<br>; cough<br>; Pain    Comparison: None. Technique: Routine. Findings:   No infiltrate. No effusion. No edema. Cardiac silhouette unremarkable. Impression    Impression:   1. Normal exam.      Slot # 62    Thank you for the referral of this patient. This exam was interpreted by an   American Board of Radiology certified Diversified radiologist with subspecialty   fellowship in [address] [place] [address]. If there are any questions regarding this exam   please feel free to contact a body radiologist directly at [phone]. Or you   can reach me personally at [email or web address].          Orville Santacruz M.D., [facility]   11/13/2023 6:44:00 PM   XR KNEE RIGHT (3 VIEWS)    Narrative    EXAM: Right knee radiographs    DATE: November 13, 2023    HISTORY: Right knee pain    COMPARISON: December 15, 2021    TECHNIQUE: 3 radiographs are obtained of the right knee    FINDINGS:    The osseous structures are demineralized. There is a well-positioned right knee   prosthesis. There is at least a moderately sized knee joint effusion. Generalized edema along the anterior aspect of the knee. There is also edema   within Hoffa's fat pad. Impression    1. No acute osseous abnormality. The right knee prosthesis is well-positioned. 2. Moderately sized knee joint effusion. 3. Generalized subcutaneous edema along the anterior aspect of the knee. Correlate for cellulitis or an infectious process.      Kaitlyn Wu M.D.   11/13/2023 8:01:00 PM   Basic Metabolic Panel   Result Value Ref Range    Sodium 140 133 - 143 mmol/L    Potassium 4.0 3.5 - 5.1 mmol/L    Chloride 107 101 - 110 mmol/L    CO2 30 21 - 32 mmol/L    Anion Gap 3 2 - 11 mmol/L    Glucose 68 65 - 100 mg/dL    BUN 11 6 - 23 MG/DL    Creatinine 0.86 0.6 - 1.0 MG/DL    Est,

## 2023-11-20 DIAGNOSIS — E55.9 VITAMIN D DEFICIENCY: ICD-10-CM

## 2023-11-20 DIAGNOSIS — E21.0 PRIMARY HYPERPARATHYROIDISM (HCC): ICD-10-CM

## 2023-11-20 DIAGNOSIS — E89.0 POSTSURGICAL HYPOTHYROIDISM: ICD-10-CM

## 2023-11-20 LAB
25(OH)D3 SERPL-MCNC: 44.8 NG/ML (ref 30–100)
CALCIUM SERPL-MCNC: 10.8 MG/DL (ref 8.3–10.4)
PHOSPHATE SERPL-MCNC: 2.5 MG/DL (ref 2.5–4.5)
PTH-INTACT SERPL-MCNC: 153.2 PG/ML (ref 18.5–88)
TSH W FREE THYROID IF ABNORMAL: 3.98 UIU/ML (ref 0.36–3.74)

## 2023-11-21 ENCOUNTER — OFFICE VISIT (OUTPATIENT)
Dept: ENDOCRINOLOGY | Age: 53
End: 2023-11-21

## 2023-11-21 VITALS
SYSTOLIC BLOOD PRESSURE: 115 MMHG | BODY MASS INDEX: 32.78 KG/M2 | OXYGEN SATURATION: 98 % | DIASTOLIC BLOOD PRESSURE: 70 MMHG | WEIGHT: 197 LBS | HEART RATE: 85 BPM

## 2023-11-21 DIAGNOSIS — E55.9 VITAMIN D DEFICIENCY: ICD-10-CM

## 2023-11-21 DIAGNOSIS — E89.0 POSTSURGICAL HYPOTHYROIDISM: Primary | ICD-10-CM

## 2023-11-21 DIAGNOSIS — E21.0 PRIMARY HYPERPARATHYROIDISM (HCC): ICD-10-CM

## 2023-11-21 LAB
ALBUMIN SERPL-MCNC: 3.4 G/DL (ref 3.5–5)
ALBUMIN/GLOB SERPL: 1.1 (ref 0.4–1.6)
ALP SERPL-CCNC: 82 U/L (ref 50–136)
ALT SERPL-CCNC: 15 U/L (ref 12–65)
ANION GAP SERPL CALC-SCNC: 8 MMOL/L (ref 2–11)
AST SERPL-CCNC: 8 U/L (ref 15–37)
BILIRUB SERPL-MCNC: 0.4 MG/DL (ref 0.2–1.1)
BUN SERPL-MCNC: 13 MG/DL (ref 6–23)
CALCIUM SERPL-MCNC: 10.3 MG/DL (ref 8.3–10.4)
CHLORIDE SERPL-SCNC: 105 MMOL/L (ref 101–110)
CO2 SERPL-SCNC: 28 MMOL/L (ref 21–32)
CREAT SERPL-MCNC: 0.7 MG/DL (ref 0.6–1)
GLOBULIN SER CALC-MCNC: 3 G/DL (ref 2.8–4.5)
GLUCOSE SERPL-MCNC: 97 MG/DL (ref 65–100)
POTASSIUM SERPL-SCNC: 2.9 MMOL/L (ref 3.5–5.1)
PROT SERPL-MCNC: 6.4 G/DL (ref 6.3–8.2)
SODIUM SERPL-SCNC: 141 MMOL/L (ref 133–143)
T4 FREE SERPL-MCNC: 1 NG/DL (ref 0.78–1.46)

## 2023-11-21 PROCEDURE — 99214 OFFICE O/P EST MOD 30 MIN: CPT | Performed by: INTERNAL MEDICINE

## 2023-11-21 PROCEDURE — 3078F DIAST BP <80 MM HG: CPT | Performed by: INTERNAL MEDICINE

## 2023-11-21 PROCEDURE — 3074F SYST BP LT 130 MM HG: CPT | Performed by: INTERNAL MEDICINE

## 2023-11-21 RX ORDER — ERGOCALCIFEROL 1.25 MG/1
50000 CAPSULE ORAL
Qty: 4 CAPSULE | Refills: 11 | Status: SHIPPED | OUTPATIENT
Start: 2023-11-21

## 2023-11-21 RX ORDER — LEVOTHYROXINE SODIUM 0.1 MG/1
100 TABLET ORAL
Qty: 90 TABLET | Refills: 3 | Status: SHIPPED | OUTPATIENT
Start: 2023-11-21

## 2023-11-21 RX ORDER — NALOXONE HYDROCHLORIDE 4 MG/.1ML
4 SPRAY NASAL
COMMUNITY
Start: 2023-08-22

## 2023-11-21 ASSESSMENT — ENCOUNTER SYMPTOMS
CONSTIPATION: 1
TROUBLE SWALLOWING: 1
DIARRHEA: 0

## 2023-11-21 NOTE — PROGRESS NOTES
Kay Oneal MD, [facility]            Reason for visit: follow-up of hypothyroidism and hyperparathyroidism      ASSESSMENT AND PLAN:    1. Postsurgical hypothyroidism  She is undertreated. I will increase her levothyroxine dose as below. She will recheck thyroid function tests in 2 months and then again prior to the next appointment with me. - levothyroxine (SYNTHROID) 100 MCG tablet; Take 1 tablet by mouth every morning (before breakfast)  Dispense: 90 tablet; Refill: 3  - TSH; Future  - T4, Free; Future  - TSH; Future  - T4, Free; Future    2. Primary hyperparathyroidism ([address])  Ms. Rome Martínez is now status post redo parathyroidectomy in January 2021 for recurrence of primary hyperparathyroidism. Intraoperative parathyroid hormone fell as expected, suggesting cure. Likewise, her first postoperative calcium was normal.  Somewhat surprisingly, calcium levels checked since then (including her most recent labs below) have been slightly elevated and phosphorus levels have been slightly low, both suggestive of a second recurrence of primary hyperparathyroidism. Parathyroid hormone is nonsuppressed. She clearly has persistent/recurrent primary hyperparathyroidism. I doubt that she is a candidate for a third parathyroid operation. Medical therapy with cinacalcet is indicated only for symptomatic hypercalcemia associated with primary hyperparathyroidism. Her indication for treatment was her young age, not symptoms. For now, I will monitor her calcium and parathyroid hormone over time. - Comprehensive Metabolic Panel; Future  - Phosphorus; Future    3. Vitamin D deficiency  Vitamin D is replete. - ergocalciferol (ERGOCALCIFEROL) 1.25 MG (63921 UT) capsule; Take 1 capsule by mouth every 7 days  Dispense: 4 capsule; Refill: 11  - Vitamin D 25 Hydroxy; Future      Follow-up and Dispositions    Return in about 4 months (around 3/21/2024).

## 2024-01-22 DIAGNOSIS — E89.0 POSTSURGICAL HYPOTHYROIDISM: ICD-10-CM

## 2024-01-22 LAB
T4 FREE SERPL-MCNC: 1 NG/DL (ref 0.78–1.46)
TSH, 3RD GENERATION: 0.8 UIU/ML (ref 0.36–3.74)

## 2024-03-15 DIAGNOSIS — E55.9 VITAMIN D DEFICIENCY: ICD-10-CM

## 2024-03-15 DIAGNOSIS — E89.0 POSTSURGICAL HYPOTHYROIDISM: ICD-10-CM

## 2024-03-15 DIAGNOSIS — E21.0 PRIMARY HYPERPARATHYROIDISM (HCC): ICD-10-CM

## 2024-03-15 LAB
25(OH)D3 SERPL-MCNC: 33.2 NG/ML (ref 30–100)
ALBUMIN SERPL-MCNC: 3.8 G/DL (ref 3.5–5)
ALBUMIN/GLOB SERPL: 1.2 (ref 0.4–1.6)
ALP SERPL-CCNC: 92 U/L (ref 50–136)
ALT SERPL-CCNC: 19 U/L (ref 12–65)
ANION GAP SERPL CALC-SCNC: 4 MMOL/L (ref 2–11)
AST SERPL-CCNC: 17 U/L (ref 15–37)
BILIRUB SERPL-MCNC: 1.4 MG/DL (ref 0.2–1.1)
BUN SERPL-MCNC: 11 MG/DL (ref 6–23)
CALCIUM SERPL-MCNC: 10.6 MG/DL (ref 8.3–10.4)
CHLORIDE SERPL-SCNC: 106 MMOL/L (ref 103–113)
CO2 SERPL-SCNC: 30 MMOL/L (ref 21–32)
CREAT SERPL-MCNC: 0.6 MG/DL (ref 0.6–1)
GLOBULIN SER CALC-MCNC: 3.3 G/DL (ref 2.8–4.5)
GLUCOSE SERPL-MCNC: 79 MG/DL (ref 65–100)
PHOSPHATE SERPL-MCNC: 2.3 MG/DL (ref 2.5–4.5)
POTASSIUM SERPL-SCNC: 3.8 MMOL/L (ref 3.5–5.1)
PROT SERPL-MCNC: 7.1 G/DL (ref 6.3–8.2)
SODIUM SERPL-SCNC: 140 MMOL/L (ref 136–146)
T4 FREE SERPL-MCNC: 1.1 NG/DL (ref 0.78–1.46)
TSH, 3RD GENERATION: 1.43 UIU/ML (ref 0.36–3.74)

## 2024-04-29 ENCOUNTER — OFFICE VISIT (OUTPATIENT)
Dept: ENDOCRINOLOGY | Age: 54
End: 2024-04-29
Payer: COMMERCIAL

## 2024-04-29 VITALS
HEART RATE: 78 BPM | BODY MASS INDEX: 32.78 KG/M2 | WEIGHT: 197 LBS | SYSTOLIC BLOOD PRESSURE: 114 MMHG | DIASTOLIC BLOOD PRESSURE: 78 MMHG

## 2024-04-29 DIAGNOSIS — E21.0 PRIMARY HYPERPARATHYROIDISM (HCC): ICD-10-CM

## 2024-04-29 DIAGNOSIS — E55.9 VITAMIN D DEFICIENCY: ICD-10-CM

## 2024-04-29 DIAGNOSIS — E89.0 POSTSURGICAL HYPOTHYROIDISM: Primary | ICD-10-CM

## 2024-04-29 PROCEDURE — 3074F SYST BP LT 130 MM HG: CPT | Performed by: INTERNAL MEDICINE

## 2024-04-29 PROCEDURE — 99214 OFFICE O/P EST MOD 30 MIN: CPT | Performed by: INTERNAL MEDICINE

## 2024-04-29 PROCEDURE — 3078F DIAST BP <80 MM HG: CPT | Performed by: INTERNAL MEDICINE

## 2024-04-29 RX ORDER — LEVOTHYROXINE SODIUM 0.1 MG/1
100 TABLET ORAL
Qty: 90 TABLET | Refills: 3 | Status: SHIPPED | OUTPATIENT
Start: 2024-04-29

## 2024-04-29 RX ORDER — VENLAFAXINE HYDROCHLORIDE 75 MG/1
75 CAPSULE, EXTENDED RELEASE ORAL DAILY
COMMUNITY

## 2024-04-29 RX ORDER — ERGOCALCIFEROL 1.25 MG/1
50000 CAPSULE ORAL
Qty: 4 CAPSULE | Refills: 11 | Status: SHIPPED | OUTPATIENT
Start: 2024-04-29

## 2024-04-29 ASSESSMENT — ENCOUNTER SYMPTOMS
DIARRHEA: 0
VOICE CHANGE: 0
TROUBLE SWALLOWING: 0
CONSTIPATION: 1

## 2024-04-29 NOTE — PROGRESS NOTES
Status:   Future     Standing Expiration Date:   4/29/2025    T4, Free     Standing Status:   Future     Standing Expiration Date:   4/29/2025    Comprehensive Metabolic Panel     Standing Status:   Future     Standing Expiration Date:   4/29/2025    Vitamin D 25 Hydroxy     Standing Status:   Future     Standing Expiration Date:   4/29/2025         Current Outpatient Medications   Medication Sig Dispense Refill    venlafaxine (EFFEXOR XR) 75 MG extended release capsule Take 1 capsule by mouth daily      levothyroxine (SYNTHROID) 100 MCG tablet Take 1 tablet by mouth every morning (before breakfast) 90 tablet 3    ergocalciferol (ERGOCALCIFEROL) 1.25 MG (93380 UT) capsule Take 1 capsule by mouth every 7 days 4 capsule 11    naloxone 4 MG/0.1ML LIQD nasal spray 1 spray once as needed      ezetimibe (ZETIA) 10 MG tablet Take 1 tablet by mouth daily 30 tablet 11    amLODIPine (NORVASC) 10 MG tablet Take 1 tablet by mouth daily      OZEMPIC, 0.25 OR 0.5 MG/DOSE, 2 MG/1.5ML SOPN Patient takes 2mg every 7 days.      Lancets MISC Accu-chek Guide fastclix lancet drums; Check BS BID; Dx:E11.9      acetaminophen (TYLENOL) 500 MG tablet Take 1 tablet by mouth every 6 hours as needed      albuterol sulfate  (90 Base) MCG/ACT inhaler Inhale 1 puff into the lungs every 4 hours as needed      ALPRAZolam (XANAX) 0.5 MG tablet Take 1 tablet by mouth 2 times daily as needed.      citalopram (CELEXA) 20 MG tablet Take 1 tablet by mouth daily      cyclobenzaprine (FLEXERIL) 10 MG tablet Take 1 tablet by mouth 3 times daily as needed      fluticasone (FLONASE) 50 MCG/ACT nasal spray 2 sprays by Nasal route daily      hydrALAZINE (APRESOLINE) 50 MG tablet Take 0.5 tablets by mouth in the morning and at bedtime      hyoscyamine (ANASPAZ;LEVSIN) 125 MCG tablet Take 1 tablet by mouth as needed      lubiprostone (AMITIZA) 8 MCG CAPS capsule Take 1 capsule by mouth as needed      nystatin (MYCOSTATIN) 223218 UNIT/GM cream Apply

## 2024-06-18 ENCOUNTER — HOSPITAL ENCOUNTER (EMERGENCY)
Age: 54
Discharge: HOME | End: 2024-06-19

## 2024-06-18 VITALS
DIASTOLIC BLOOD PRESSURE: 95 MMHG | OXYGEN SATURATION: 98 % | HEIGHT: 65 IN | WEIGHT: 195 LBS | HEART RATE: 67 BPM | TEMPERATURE: 98.3 F | BODY MASS INDEX: 32.49 KG/M2 | SYSTOLIC BLOOD PRESSURE: 134 MMHG | RESPIRATION RATE: 16 BRPM

## 2024-06-18 DIAGNOSIS — M25.561 CHRONIC PAIN OF BOTH KNEES: Primary | ICD-10-CM

## 2024-06-18 DIAGNOSIS — G89.29 CHRONIC PAIN OF BOTH KNEES: Primary | ICD-10-CM

## 2024-06-18 DIAGNOSIS — Z96.651 STATUS POST TOTAL RIGHT KNEE REPLACEMENT: ICD-10-CM

## 2024-06-18 DIAGNOSIS — M25.562 CHRONIC PAIN OF BOTH KNEES: Primary | ICD-10-CM

## 2024-06-18 PROCEDURE — 99284 EMERGENCY DEPT VISIT MOD MDM: CPT

## 2024-06-18 PROCEDURE — 96372 THER/PROPH/DIAG INJ SC/IM: CPT

## 2024-06-18 ASSESSMENT — PAIN - FUNCTIONAL ASSESSMENT: PAIN_FUNCTIONAL_ASSESSMENT: 0-10

## 2024-06-18 ASSESSMENT — LIFESTYLE VARIABLES
HOW OFTEN DO YOU HAVE A DRINK CONTAINING ALCOHOL: NEVER
HOW MANY STANDARD DRINKS CONTAINING ALCOHOL DO YOU HAVE ON A TYPICAL DAY: PATIENT DOES NOT DRINK

## 2024-06-18 ASSESSMENT — PAIN DESCRIPTION - ONSET: ONSET: SUDDEN

## 2024-06-18 ASSESSMENT — PAIN DESCRIPTION - ORIENTATION: ORIENTATION: RIGHT;LEFT

## 2024-06-18 ASSESSMENT — PAIN DESCRIPTION - LOCATION: LOCATION: KNEE

## 2024-06-18 ASSESSMENT — PAIN DESCRIPTION - PAIN TYPE: TYPE: ACUTE PAIN

## 2024-06-18 ASSESSMENT — PAIN DESCRIPTION - FREQUENCY: FREQUENCY: CONTINUOUS

## 2024-06-19 PROCEDURE — 6360000002 HC RX W HCPCS

## 2024-06-19 RX ORDER — HYDROCODONE BITARTRATE AND ACETAMINOPHEN 5; 325 MG/1; MG/1
1 TABLET ORAL EVERY 6 HOURS PRN
Qty: 10 TABLET | Refills: 0 | Status: SHIPPED | OUTPATIENT
Start: 2024-06-19 | End: 2024-06-22

## 2024-06-19 RX ORDER — METHYLPREDNISOLONE 4 MG/1
TABLET ORAL
Qty: 1 KIT | Refills: 0 | Status: SHIPPED | OUTPATIENT
Start: 2024-06-19 | End: 2024-06-25

## 2024-06-19 RX ORDER — KETOROLAC TROMETHAMINE 30 MG/ML
30 INJECTION, SOLUTION INTRAMUSCULAR; INTRAVENOUS ONCE
Status: COMPLETED | OUTPATIENT
Start: 2024-06-19 | End: 2024-06-19

## 2024-06-19 RX ADMIN — KETOROLAC TROMETHAMINE 30 MG: 30 INJECTION INTRAMUSCULAR; INTRAVENOUS at 00:38

## 2024-06-19 NOTE — ED TRIAGE NOTES
Pt arrives POV A&Ox4 ambulatory. Pt reports bilateral knee pain. Pt had R knee replaced September last year. Pt reports increase of swelling in R knee.

## 2024-06-19 NOTE — ED PROVIDER NOTES
1 spray once as needed    NYSTATIN (MYCOSTATIN) 814180 UNIT/GM CREAM    Apply topically 2 times daily    OZEMPIC, 0.25 OR 0.5 MG/DOSE, 2 MG/1.5ML SOPN    Patient takes 2mg every 7 days.    SPIRONOLACTONE (ALDACTONE) 25 MG TABLET    Take by mouth daily    TRAMADOL (ULTRAM) 50 MG TABLET    Take 1 tablet by mouth every 6 hours as needed.    VENLAFAXINE (EFFEXOR XR) 75 MG EXTENDED RELEASE CAPSULE    Take 1 capsule by mouth daily        No results found for any visits on 06/18/24.      No orders to display                No results for input(s): \"COVID19\" in the last 72 hours.    Voice dictation software was used during the making of this note.  This software is not perfect and grammatical and other typographical errors may be present.  This note has not been completely proofread for errors.     Nay Dawson PA-C  06/19/24 0125

## 2024-06-19 NOTE — DISCHARGE INSTRUCTIONS
Please begin taking the Medrol Dosepak as prescribed.  You can also take the Norco as needed for pain.  Please follow-up with the orthopedics as well as your primary care provider.  Continue to perform your at home exercises.    Return to the ED immediately if you begin to experience any redness, warmth of the joint, fever, worsening pain, or any other new or worsening symptoms.

## 2024-10-28 DIAGNOSIS — E21.0 PRIMARY HYPERPARATHYROIDISM (HCC): ICD-10-CM

## 2024-10-28 DIAGNOSIS — E55.9 VITAMIN D DEFICIENCY: ICD-10-CM

## 2024-10-28 DIAGNOSIS — E89.0 POSTSURGICAL HYPOTHYROIDISM: ICD-10-CM

## 2024-10-28 LAB
25(OH)D3 SERPL-MCNC: 41.2 NG/ML (ref 30–100)
ALBUMIN SERPL-MCNC: 3.7 G/DL (ref 3.5–5)
ALBUMIN/GLOB SERPL: 1 (ref 1–1.9)
ALP SERPL-CCNC: 95 U/L (ref 35–104)
ALT SERPL-CCNC: 13 U/L (ref 8–45)
ANION GAP SERPL CALC-SCNC: 9 MMOL/L (ref 9–18)
AST SERPL-CCNC: 18 U/L (ref 15–37)
BILIRUB SERPL-MCNC: 1 MG/DL (ref 0–1.2)
BUN SERPL-MCNC: 8 MG/DL (ref 6–23)
CALCIUM SERPL-MCNC: 10.7 MG/DL (ref 8.8–10.2)
CHLORIDE SERPL-SCNC: 105 MMOL/L (ref 98–107)
CO2 SERPL-SCNC: 27 MMOL/L (ref 20–28)
CREAT SERPL-MCNC: 0.74 MG/DL (ref 0.6–1.1)
GLOBULIN SER CALC-MCNC: 3.9 G/DL (ref 2.3–3.5)
GLUCOSE SERPL-MCNC: 88 MG/DL (ref 70–99)
POTASSIUM SERPL-SCNC: 3.8 MMOL/L (ref 3.5–5.1)
PROT SERPL-MCNC: 7.7 G/DL (ref 6.3–8.2)
SODIUM SERPL-SCNC: 140 MMOL/L (ref 136–145)
T4 FREE SERPL-MCNC: 1.5 NG/DL (ref 0.9–1.7)
TSH, 3RD GENERATION: 0.19 UIU/ML (ref 0.27–4.2)

## 2024-10-29 ENCOUNTER — OFFICE VISIT (OUTPATIENT)
Dept: ENDOCRINOLOGY | Age: 54
End: 2024-10-29
Payer: MEDICAID

## 2024-10-29 VITALS — WEIGHT: 200 LBS | BODY MASS INDEX: 33.28 KG/M2 | DIASTOLIC BLOOD PRESSURE: 85 MMHG | SYSTOLIC BLOOD PRESSURE: 122 MMHG

## 2024-10-29 DIAGNOSIS — E55.9 VITAMIN D DEFICIENCY: ICD-10-CM

## 2024-10-29 DIAGNOSIS — E89.0 POSTSURGICAL HYPOTHYROIDISM: Primary | ICD-10-CM

## 2024-10-29 DIAGNOSIS — E21.0 PRIMARY HYPERPARATHYROIDISM (HCC): ICD-10-CM

## 2024-10-29 PROCEDURE — 3074F SYST BP LT 130 MM HG: CPT | Performed by: INTERNAL MEDICINE

## 2024-10-29 PROCEDURE — 99214 OFFICE O/P EST MOD 30 MIN: CPT | Performed by: INTERNAL MEDICINE

## 2024-10-29 PROCEDURE — 3079F DIAST BP 80-89 MM HG: CPT | Performed by: INTERNAL MEDICINE

## 2024-10-29 RX ORDER — LEVOTHYROXINE SODIUM 100 UG/1
TABLET ORAL
Qty: 83 TABLET | Refills: 3 | Status: SHIPPED | OUTPATIENT
Start: 2024-10-29

## 2024-10-29 RX ORDER — ERGOCALCIFEROL 1.25 MG/1
50000 CAPSULE ORAL
Qty: 4 CAPSULE | Refills: 11 | Status: SHIPPED | OUTPATIENT
Start: 2024-10-29

## 2024-10-29 ASSESSMENT — ENCOUNTER SYMPTOMS
TROUBLE SWALLOWING: 0
DIARRHEA: 0
VOICE CHANGE: 0
CONSTIPATION: 1

## 2024-10-29 NOTE — PROGRESS NOTES
MALLORY Esteban MD, FACE    [facility] ENDOCRINOLOGY   AND   THYROID NODULE CLINIC            Reason for visit: follow-up of hypothyroidism and hyperparathyroidism      ASSESSMENT AND PLAN:    1. Postsurgical hypothyroidism  She is overtreated.  I will reduce her levothyroxine dose as below.  She will recheck TSH/free T4 in 6 weeks and again prior to the next appointment with me.  - levothyroxine (SYNTHROID) 100 MCG tablet; 1 table daily 6 days/week and 1/2 tablet daily 1 day/week  Dispense: 83 tablet; Refill: 3  - TSH; Future  - T4, Free; Future  - TSH; Future  - T4, Free; Future    2. Primary hyperparathyroidism (HCC)  Ms. Florez is now status post redo parathyroidectomy in January 2021 for recurrence of primary hyperparathyroidism.  Intraoperative parathyroid hormone fell as expected, suggesting cure.  Likewise, her first postoperative calcium was normal.  Somewhat surprisingly, calcium levels checked since then (including her most recent labs below) have been slightly elevated and phosphorus levels have been slightly low, both suggestive of a second recurrence of primary hyperparathyroidism.  Parathyroid hormone is nonsuppressed.  She clearly has persistent/recurrent primary hyperparathyroidism.   I doubt that she is a candidate for a third parathyroid operation.  Medical therapy with cinacalcet is indicated only for symptomatic hypercalcemia associated with primary hyperparathyroidism.  Her indication for treatment was her young age, not symptoms.  For now, I will monitor her calcium and parathyroid hormone over time.  - Comprehensive Metabolic Panel; Future    3. Vitamin D deficiency  Vitamin D is replete.  Continue prescription vitamin D replacement.  - ergocalciferol (ERGOCALCIFEROL) 1.25 MG (37986 UT) capsule; Take 1 capsule by mouth every 7 days  Dispense: 4 capsule; Refill: 11  - Vitamin D 25 Hydroxy; Future      Follow-up and Dispositions    Return in about 4 months (around 2/28/2025).

## 2024-12-04 ENCOUNTER — OFFICE VISIT (OUTPATIENT)
Age: 54
End: 2024-12-04
Payer: COMMERCIAL

## 2024-12-04 VITALS
HEIGHT: 65 IN | HEART RATE: 62 BPM | DIASTOLIC BLOOD PRESSURE: 80 MMHG | WEIGHT: 203 LBS | BODY MASS INDEX: 33.82 KG/M2 | SYSTOLIC BLOOD PRESSURE: 128 MMHG

## 2024-12-04 DIAGNOSIS — R55 NEAR SYNCOPE: ICD-10-CM

## 2024-12-04 DIAGNOSIS — E11.9 TYPE 2 DIABETES MELLITUS WITHOUT COMPLICATION, UNSPECIFIED WHETHER LONG TERM INSULIN USE (HCC): ICD-10-CM

## 2024-12-04 DIAGNOSIS — I10 ESSENTIAL (PRIMARY) HYPERTENSION: Primary | ICD-10-CM

## 2024-12-04 DIAGNOSIS — E78.2 MIXED HYPERLIPIDEMIA: ICD-10-CM

## 2024-12-04 DIAGNOSIS — Z78.9 STATIN INTOLERANCE: ICD-10-CM

## 2024-12-04 PROCEDURE — 99214 OFFICE O/P EST MOD 30 MIN: CPT | Performed by: INTERNAL MEDICINE

## 2024-12-04 PROCEDURE — 93000 ELECTROCARDIOGRAM COMPLETE: CPT | Performed by: INTERNAL MEDICINE

## 2024-12-04 PROCEDURE — 3079F DIAST BP 80-89 MM HG: CPT | Performed by: INTERNAL MEDICINE

## 2024-12-04 PROCEDURE — 3074F SYST BP LT 130 MM HG: CPT | Performed by: INTERNAL MEDICINE

## 2024-12-04 RX ORDER — CETIRIZINE HYDROCHLORIDE 10 MG/1
TABLET ORAL
COMMUNITY

## 2024-12-04 RX ORDER — ONDANSETRON 4 MG/1
4 TABLET, ORALLY DISINTEGRATING ORAL
COMMUNITY
Start: 2024-01-03

## 2024-12-04 RX ORDER — EZETIMIBE 10 MG/1
10 TABLET ORAL DAILY
Qty: 30 TABLET | Refills: 11 | Status: SHIPPED | OUTPATIENT
Start: 2024-12-04

## 2024-12-04 RX ORDER — ESTRADIOL 0.1 MG/G
CREAM VAGINAL
COMMUNITY

## 2024-12-04 RX ORDER — MELOXICAM 15 MG/1
15 TABLET ORAL DAILY
COMMUNITY

## 2024-12-04 ASSESSMENT — ENCOUNTER SYMPTOMS
SHORTNESS OF BREATH: 0
COUGH: 0

## 2024-12-04 NOTE — PROGRESS NOTES
[facility] CARDIOLOGY, [address], [address]     [place] [id number]      Patient:  Shell Florez  1970         SUBJECTIVE:  Shell Florez is a  54 y.o. female seen for a follow up visit regarding the following:     Chief Complaint   Patient presents with    1 Year Follow Up    Hypertension    Hyperlipidemia     CC: HTN, HLD     HPI:   54 y.o. female with a history of HTN, HLD, DM type II who is here for follow up.     Patient was last seen in office on 8/8/23 , since then reports that she has bene having some knee pain after knee surgery last sept 2023. Right knee pain intermittent. No chest pain ore racing heart, dyspnea. 3 weeks ago she had a fall when she was in the shower, she is not sure if she lost balance or had LOC.  No injuries at that time. No head trauma. Does not check BP at home.     Cardiovascular Testing:  - Echo 2/4/20: LVEF >60%, RV normal size and function, trivial AI  - SPECT 3/3/20: exercise 6 min, no EKG changes, normal perfusion, LVEF 66%    Past medical history, past surgical history, family history, social history, and medications were all reviewed with the patient today and updated as necessary.         Patient Active Problem List    Diagnosis Date Noted    Vitamin D deficiency 05/24/2022     Priority: Medium    Primary hyperparathyroidism (HCC)     Postsurgical hypothyroidism     ANTONETTE (obstructive sleep apnea) 10/04/2019    BMI 45.0-49.9, adult 06/03/2019     Last Assessment & Plan:   BMI counseling: Due to this patient's BMI, I have provided counseling   regarding nutrition. and Due to this patient's BMI, I have provided   counseling regarding physical activity.        Helicobacter pylori infection 06/03/2019     Last Assessment & Plan:   Encouraged her to complete treatment.  Will conduct breath test in 6 weeks   to confirm eradication.  Educated patient on H. pyori's mechanism of action and reassured her there   is a very low risk of gastric cancer.

## 2024-12-10 DIAGNOSIS — E89.0 POSTSURGICAL HYPOTHYROIDISM: ICD-10-CM

## 2024-12-10 DIAGNOSIS — E78.2 MIXED HYPERLIPIDEMIA: ICD-10-CM

## 2024-12-10 LAB
ALBUMIN SERPL-MCNC: 3.9 G/DL (ref 3.5–5)
ALBUMIN/GLOB SERPL: 1 (ref 1–1.9)
ALP SERPL-CCNC: 92 U/L (ref 35–104)
ALT SERPL-CCNC: 14 U/L (ref 8–45)
ANION GAP SERPL CALC-SCNC: 9 MMOL/L (ref 7–16)
AST SERPL-CCNC: 23 U/L (ref 15–37)
BILIRUB SERPL-MCNC: 0.8 MG/DL (ref 0–1.2)
BUN SERPL-MCNC: 15 MG/DL (ref 6–23)
CALCIUM SERPL-MCNC: 10.9 MG/DL (ref 8.8–10.2)
CHLORIDE SERPL-SCNC: 104 MMOL/L (ref 98–107)
CHOLEST SERPL-MCNC: 214 MG/DL (ref 0–200)
CO2 SERPL-SCNC: 27 MMOL/L (ref 20–29)
CREAT SERPL-MCNC: 0.76 MG/DL (ref 0.6–1.1)
GLOBULIN SER CALC-MCNC: 3.7 G/DL (ref 2.3–3.5)
GLUCOSE SERPL-MCNC: 80 MG/DL (ref 70–99)
HDLC SERPL-MCNC: 52 MG/DL (ref 40–60)
HDLC SERPL: 4.1 (ref 0–5)
LDLC SERPL CALC-MCNC: 154 MG/DL (ref 0–100)
POTASSIUM SERPL-SCNC: 4.1 MMOL/L (ref 3.5–5.1)
PROT SERPL-MCNC: 7.6 G/DL (ref 6.3–8.2)
SODIUM SERPL-SCNC: 141 MMOL/L (ref 136–145)
T4 FREE SERPL-MCNC: 1.3 NG/DL (ref 0.9–1.7)
TRIGL SERPL-MCNC: 39 MG/DL (ref 0–150)
TSH, 3RD GENERATION: 0.55 UIU/ML (ref 0.27–4.2)
VLDLC SERPL CALC-MCNC: 8 MG/DL (ref 6–23)

## 2024-12-11 DIAGNOSIS — E78.2 MIXED HYPERLIPIDEMIA: Primary | ICD-10-CM

## 2024-12-11 NOTE — RESULT ENCOUNTER NOTE
Please call the patient regarding their result.    Cholesterol numbers remain significantly elevated, liver and kidney numbers look good.    Recommend she continue Zetia 10 mg oral once daily.    If she is agreeable please have her start Repatha 140 mg subcutaneous injection every 14 days and check CMP and fasting lipids in 1 month.     Thank you.

## 2024-12-11 NOTE — TELEPHONE ENCOUNTER
----- Message from Dr. Kane Shankar, DO sent at 12/11/2024  8:44 AM EST -----  Please call the patient regarding their result.    Cholesterol numbers remain significantly elevated, liver and kidney numbers look good.    Recommend she continue Zetia 10 mg oral once daily.    If she is agreeable please have her start Repatha 140 mg subcutaneous injection every 14 days and check CMP and fasting lipids in 1 month.     Thank you.

## 2024-12-11 NOTE — TELEPHONE ENCOUNTER
Informed patient of results. Voiced understanding. Agreeable to starting Repatha.     Requested Prescriptions     Pending Prescriptions Disp Refills    Evolocumab 140 MG/ML SOAJ 6 Adjustable Dose Pre-filled Pen Syringe 3     Sig: Inject 1 Adjustable Dose Pre-filled Pen Syringe into the skin every 14 days

## 2024-12-19 ENCOUNTER — TELEPHONE (OUTPATIENT)
Age: 54
End: 2024-12-19

## 2024-12-19 NOTE — TELEPHONE ENCOUNTER
----- Message from Dr. Kane Shankar, DO sent at 12/19/2024 12:24 PM EST -----  Please call the patient regarding their echocardiogram result.    Heart squeezing function is normal on echocardiogram, no severe valve abnormalities. Heart monitor showed no sustained abnormal heart rhythms.     Echo showed possible liver cyst, I recommend she call for appointment with PCP to better evaluate. I have placed an order for liver ultrasound as well, she should get a call from radiology regarding scheduling.

## 2024-12-28 ENCOUNTER — HOSPITAL ENCOUNTER (OUTPATIENT)
Dept: MAMMOGRAPHY | Age: 54
Discharge: HOME | End: 2024-12-31
Payer: COMMERCIAL

## 2024-12-28 DIAGNOSIS — Z12.31 ENCOUNTER FOR SCREENING MAMMOGRAM FOR BREAST CANCER: ICD-10-CM

## 2024-12-28 PROCEDURE — 77063 BREAST TOMOSYNTHESIS BI: CPT

## 2024-12-28 PROCEDURE — 77067 SCR MAMMO BI INCL CAD: CPT

## 2024-12-30 ENCOUNTER — TRANSCRIBE ORDERS (OUTPATIENT)
Dept: SCHEDULING | Age: 54
End: 2024-12-30

## 2024-12-30 DIAGNOSIS — K76.89 LIVER CYST: Primary | ICD-10-CM

## 2025-01-08 ENCOUNTER — HOSPITAL ENCOUNTER (OUTPATIENT)
Dept: ULTRASOUND IMAGING | Age: 55
Discharge: HOME | End: 2025-01-11
Attending: INTERNAL MEDICINE
Payer: COMMERCIAL

## 2025-01-08 DIAGNOSIS — K76.89 LIVER CYST: ICD-10-CM

## 2025-01-08 PROCEDURE — 76705 ECHO EXAM OF ABDOMEN: CPT

## 2025-01-10 NOTE — RESULT ENCOUNTER NOTE
Please call the patient regarding their result.    Abdominal ultrasound shows liver cyst as well as bile stones. Recommend she call her PCP for further evaluation. If she has abdominal pain should go to the ER or call 911 if severe.

## 2025-01-13 ENCOUNTER — TELEPHONE (OUTPATIENT)
Age: 55
End: 2025-01-13

## 2025-01-13 NOTE — TELEPHONE ENCOUNTER
----- Message from Dr. Kane Shankar, DO sent at 1/10/2025  2:33 PM EST -----  Please call the patient regarding their result.    Abdominal ultrasound shows liver cyst as well as bile stones. Recommend she call her PCP for further evaluation. If she has abdominal pain should go to the ER or call 911 if severe.

## 2025-03-03 DIAGNOSIS — E21.0 PRIMARY HYPERPARATHYROIDISM: ICD-10-CM

## 2025-03-03 DIAGNOSIS — E55.9 VITAMIN D DEFICIENCY: ICD-10-CM

## 2025-03-03 DIAGNOSIS — E89.0 POSTSURGICAL HYPOTHYROIDISM: ICD-10-CM

## 2025-03-03 LAB
25(OH)D3 SERPL-MCNC: 39.3 NG/ML (ref 30–100)
ALBUMIN SERPL-MCNC: 3.7 G/DL (ref 3.5–5)
ALBUMIN/GLOB SERPL: 1.1 (ref 1–1.9)
ALP SERPL-CCNC: 83 U/L (ref 35–104)
ALT SERPL-CCNC: 19 U/L (ref 8–45)
ANION GAP SERPL CALC-SCNC: 10 MMOL/L (ref 7–16)
AST SERPL-CCNC: 22 U/L (ref 15–37)
BILIRUB SERPL-MCNC: 0.8 MG/DL (ref 0–1.2)
BUN SERPL-MCNC: 6 MG/DL (ref 6–23)
CALCIUM SERPL-MCNC: 10.8 MG/DL (ref 8.8–10.2)
CHLORIDE SERPL-SCNC: 101 MMOL/L (ref 98–107)
CO2 SERPL-SCNC: 27 MMOL/L (ref 20–29)
CREAT SERPL-MCNC: 0.77 MG/DL (ref 0.6–1.1)
GLOBULIN SER CALC-MCNC: 3.3 G/DL (ref 2.3–3.5)
GLUCOSE SERPL-MCNC: 66 MG/DL (ref 70–99)
POTASSIUM SERPL-SCNC: 3.8 MMOL/L (ref 3.5–5.1)
PROT SERPL-MCNC: 7 G/DL (ref 6.3–8.2)
SODIUM SERPL-SCNC: 138 MMOL/L (ref 136–145)
T4 FREE SERPL-MCNC: 1.2 NG/DL (ref 0.9–1.7)
TSH, 3RD GENERATION: 0.38 UIU/ML (ref 0.27–4.2)

## 2025-03-04 ENCOUNTER — OFFICE VISIT (OUTPATIENT)
Dept: ENDOCRINOLOGY | Age: 55
End: 2025-03-04
Payer: COMMERCIAL

## 2025-03-04 VITALS
SYSTOLIC BLOOD PRESSURE: 118 MMHG | WEIGHT: 205 LBS | DIASTOLIC BLOOD PRESSURE: 70 MMHG | BODY MASS INDEX: 34.16 KG/M2 | OXYGEN SATURATION: 97 % | HEART RATE: 88 BPM | HEIGHT: 65 IN

## 2025-03-04 DIAGNOSIS — E21.0 PRIMARY HYPERPARATHYROIDISM: ICD-10-CM

## 2025-03-04 DIAGNOSIS — E55.9 VITAMIN D DEFICIENCY: ICD-10-CM

## 2025-03-04 DIAGNOSIS — E89.0 POSTSURGICAL HYPOTHYROIDISM: Primary | ICD-10-CM

## 2025-03-04 PROCEDURE — 3078F DIAST BP <80 MM HG: CPT | Performed by: INTERNAL MEDICINE

## 2025-03-04 PROCEDURE — 3074F SYST BP LT 130 MM HG: CPT | Performed by: INTERNAL MEDICINE

## 2025-03-04 PROCEDURE — 99214 OFFICE O/P EST MOD 30 MIN: CPT | Performed by: INTERNAL MEDICINE

## 2025-03-04 RX ORDER — DOXYCYCLINE HYCLATE 100 MG
100 TABLET ORAL 2 TIMES DAILY
COMMUNITY
Start: 2025-02-14

## 2025-03-04 RX ORDER — ERGOCALCIFEROL 1.25 MG/1
50000 CAPSULE ORAL
Qty: 4 CAPSULE | Refills: 11 | Status: SHIPPED | OUTPATIENT
Start: 2025-03-04

## 2025-03-04 RX ORDER — LEVOTHYROXINE SODIUM 100 UG/1
TABLET ORAL
Qty: 83 TABLET | Refills: 3 | Status: SHIPPED | OUTPATIENT
Start: 2025-03-04

## 2025-03-04 ASSESSMENT — ENCOUNTER SYMPTOMS
CONSTIPATION: 1
DIARRHEA: 0
TROUBLE SWALLOWING: 0
VOICE CHANGE: 0

## 2025-03-04 NOTE — PROGRESS NOTES
MALLORY Esteban MD, [facility] ENDOCRINOLOGY   AND   THYROID NODULE CLINIC            Reason for visit: follow-up of hypothyroidism and hyperparathyroidism      ASSESSMENT AND PLAN:    1. Postsurgical hypothyroidism  Shell Florez is biochemically euthyroid.  Continue thyroid hormone replacement as prescribed.   Residual symptoms are attributable to something other than thyroid dysfunction.   Repeat thyroid function tests prior to the next appointment.  - levothyroxine (SYNTHROID) 100 MCG tablet; 1 table daily 6 days/week and 1/2 tablet daily 1 day/week  Dispense: 83 tablet; Refill: 3    2. Primary hyperparathyroidism (HCC)  Ms. Florez is now status post redo parathyroidectomy in January 2021 for recurrence of primary hyperparathyroidism.  Intraoperative parathyroid hormone fell as expected, suggesting cure.  Likewise, her first postoperative calcium was normal.  Somewhat surprisingly, calcium levels checked since then (including her most recent labs below) have been slightly elevated and phosphorus levels have been slightly low, both suggestive of a second recurrence of primary hyperparathyroidism.  Parathyroid hormone is nonsuppressed.  She clearly has persistent/recurrent primary hyperparathyroidism.   I doubt that she is a candidate for a third parathyroid operation.  Medical therapy with cinacalcet is indicated only for symptomatic hypercalcemia associated with primary hyperparathyroidism.  Her indication for treatment was her young age, not symptoms.  For now, I will monitor her calcium over time.    3. Vitamin D deficiency  Vitamin D is replete.  Continue prescription vitamin D replacement.  - ergocalciferol (ERGOCALCIFEROL) 1.25 MG (30507 UT) capsule; Take 1 capsule by mouth every 7 days  Dispense: 4 capsule; Refill: 11      Follow-up and Dispositions    Return in about 6 months (around 9/4/2025).                 History of Present Illness:    HYPERCALCEMIA  Shell Florez is here

## 2025-04-06 ENCOUNTER — APPOINTMENT (OUTPATIENT)
Dept: GENERAL RADIOLOGY | Age: 55
End: 2025-04-06
Payer: COMMERCIAL

## 2025-04-06 ENCOUNTER — HOSPITAL ENCOUNTER (EMERGENCY)
Age: 55
Discharge: HOME | End: 2025-04-06
Payer: COMMERCIAL

## 2025-04-06 VITALS
OXYGEN SATURATION: 100 % | BODY MASS INDEX: 33.99 KG/M2 | RESPIRATION RATE: 16 BRPM | HEART RATE: 75 BPM | TEMPERATURE: 97.7 F | DIASTOLIC BLOOD PRESSURE: 101 MMHG | HEIGHT: 65 IN | SYSTOLIC BLOOD PRESSURE: 150 MMHG | WEIGHT: 204 LBS
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
## 2025-04-06 DIAGNOSIS — M25.561 LATERAL KNEE PAIN, RIGHT: ICD-10-CM

## 2025-04-06 DIAGNOSIS — S39.012A STRAIN OF LUMBAR REGION, INITIAL ENCOUNTER: ICD-10-CM

## 2025-04-06 DIAGNOSIS — S29.019A THORACIC MYOFASCIAL STRAIN, INITIAL ENCOUNTER: ICD-10-CM

## 2025-04-06 DIAGNOSIS — M51.35 DDD (DEGENERATIVE DISC DISEASE), THORACOLUMBAR: ICD-10-CM

## 2025-04-06 PROBLEM — Z86.19 HISTORY OF HELICOBACTER PYLORI INFECTION: Status: ACTIVE | Noted: 2019-06-03

## 2025-04-06 PROBLEM — M17.0 PRIMARY OSTEOARTHRITIS OF BOTH KNEES: Status: ACTIVE | Noted: 2021-09-09

## 2025-04-06 PROBLEM — Z86.0101 HISTORY OF ADENOMATOUS POLYP OF COLON: Status: ACTIVE | Noted: 2017-10-10

## 2025-04-06 PROBLEM — K22.70 BARRETT'S ESOPHAGUS: Status: ACTIVE | Noted: 2020-11-03

## 2025-04-06 PROBLEM — M17.11 PRIMARY OSTEOARTHRITIS OF RIGHT KNEE: Status: ACTIVE | Noted: 2023-03-03

## 2025-04-06 PROBLEM — K21.9 GASTROESOPHAGEAL REFLUX DISEASE: Status: ACTIVE | Noted: 2020-07-28

## 2025-04-06 PROBLEM — E89.0 POSTOPERATIVE HYPOTHYROIDISM: Status: ACTIVE | Noted: 2021-01-23

## 2025-04-06 PROBLEM — R63.4 UNEXPLAINED WEIGHT LOSS: Status: ACTIVE | Noted: 2021-12-08

## 2025-04-06 PROBLEM — R10.9 ABDOMINAL PAIN: Status: ACTIVE | Noted: 2022-07-12

## 2025-04-06 PROBLEM — E11.9 TYPE 2 DIABETES MELLITUS WITHOUT COMPLICATION, WITHOUT LONG-TERM CURRENT USE OF INSULIN: Status: ACTIVE | Noted: 2024-12-30

## 2025-04-06 PROBLEM — K76.89 LIVER CYST: Status: ACTIVE | Noted: 2024-12-30

## 2025-04-06 PROBLEM — U07.1 COVID-19: Status: ACTIVE | Noted: 2020-06-27

## 2025-04-06 PROBLEM — K57.90 DIVERTICULOSIS: Status: ACTIVE | Noted: 2020-03-11

## 2025-04-06 PROBLEM — E66.01 SEVERE OBESITY (BMI 35.0-35.9 WITH COMORBIDITY): Status: ACTIVE | Noted: 2023-01-17

## 2025-04-06 PROBLEM — R10.13 EPIGASTRIC PAIN: Status: ACTIVE | Noted: 2022-07-12

## 2025-04-06 PROCEDURE — 71046 X-RAY EXAM CHEST 2 VIEWS: CPT

## 2025-04-06 PROCEDURE — 72072 X-RAY EXAM THORAC SPINE 3VWS: CPT

## 2025-04-06 PROCEDURE — 72100 X-RAY EXAM L-S SPINE 2/3 VWS: CPT

## 2025-04-06 PROCEDURE — 99284 EMERGENCY DEPT VISIT MOD MDM: CPT

## 2025-04-06 PROCEDURE — 6370000000 HC RX 637 (ALT 250 FOR IP)

## 2025-04-06 PROCEDURE — 73562 X-RAY EXAM OF KNEE 3: CPT

## 2025-04-06 PROCEDURE — 6360000002 HC RX W HCPCS

## 2025-04-06 PROCEDURE — 96372 THER/PROPH/DIAG INJ SC/IM: CPT

## 2025-04-06 RX ORDER — ALPRAZOLAM 0.5 MG
0.5 TABLET ORAL
Status: COMPLETED | OUTPATIENT
Start: 2025-04-06 | End: 2025-04-06

## 2025-04-06 RX ORDER — KETOROLAC TROMETHAMINE 30 MG/ML
30 INJECTION, SOLUTION INTRAMUSCULAR; INTRAVENOUS
Status: COMPLETED | OUTPATIENT
Start: 2025-04-06 | End: 2025-04-06

## 2025-04-06 RX ORDER — METHOCARBAMOL 750 MG/1
750 TABLET, FILM COATED ORAL
Status: COMPLETED | OUTPATIENT
Start: 2025-04-06 | End: 2025-04-06

## 2025-04-06 RX ORDER — METHOCARBAMOL 750 MG/1
750 TABLET, FILM COATED ORAL 4 TIMES DAILY
Qty: 56 TABLET | Refills: 0 | Status: SHIPPED | OUTPATIENT
Start: 2025-04-06 | End: 2025-04-20

## 2025-04-06 RX ADMIN — ALPRAZOLAM 0.5 MG: 0.5 TABLET ORAL at 18:23

## 2025-04-06 RX ADMIN — METHOCARBAMOL 750 MG: 750 TABLET ORAL at 18:22

## 2025-04-06 RX ADMIN — KETOROLAC TROMETHAMINE 30 MG: 30 INJECTION, SOLUTION INTRAMUSCULAR at 18:24

## 2025-04-06 ASSESSMENT — PAIN DESCRIPTION - ORIENTATION: ORIENTATION: RIGHT

## 2025-04-06 ASSESSMENT — PAIN - FUNCTIONAL ASSESSMENT: PAIN_FUNCTIONAL_ASSESSMENT: 0-10

## 2025-04-06 ASSESSMENT — PAIN DESCRIPTION - LOCATION: LOCATION: KNEE

## 2025-04-06 ASSESSMENT — PAIN SCALES - GENERAL: PAINLEVEL_OUTOF10: 7
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
## 2025-09-04 DIAGNOSIS — E11.9 TYPE 2 DIABETES MELLITUS WITHOUT COMPLICATION, WITHOUT LONG-TERM CURRENT USE OF INSULIN (HCC): Primary | ICD-10-CM

## (undated) DEVICE — SYR 3ML LL TIP 1/10ML GRAD --

## (undated) DEVICE — SOLUTION EFT TEST GI VISCOUS

## (undated) DEVICE — CUP MED 1OZ CLR POLYPR FEED GRAD W/O LID

## (undated) DEVICE — SYRINGE CATH TIP 50ML

## (undated) DEVICE — 3M™ TRANSPORE™ WHITE SURGICAL TAPE 1534-1, 1 INCH X 10 YARD (2,5CM X 9,1M), 12 ROLLS/CARTON 10 CARTONS/CASE: Brand: 3M™ TRANSPORE™

## (undated) DEVICE — TISSUE FACE KLNX 9X8IN --

## (undated) DEVICE — BASIN EMESIS 500CC ROSE 250/CS 60/PLT: Brand: MEDEGEN MEDICAL PRODUCTS, LLC